# Patient Record
Sex: FEMALE | Race: WHITE | HISPANIC OR LATINO | Employment: STUDENT
[De-identification: names, ages, dates, MRNs, and addresses within clinical notes are randomized per-mention and may not be internally consistent; named-entity substitution may affect disease eponyms.]

---

## 2017-11-26 ENCOUNTER — HEALTH MAINTENANCE LETTER (OUTPATIENT)
Age: 22
End: 2017-11-26

## 2020-01-22 ENCOUNTER — OFFICE VISIT (OUTPATIENT)
Dept: ORTHOPEDICS | Facility: CLINIC | Age: 25
End: 2020-01-22
Payer: COMMERCIAL

## 2020-01-22 VITALS — BODY MASS INDEX: 25.47 KG/M2 | HEIGHT: 72 IN | WEIGHT: 188 LBS | RESPIRATION RATE: 16 BRPM

## 2020-01-22 DIAGNOSIS — M67.969 TENDINOPATHY OF PATELLA: Primary | ICD-10-CM

## 2020-01-22 RX ADMIN — LIDOCAINE HYDROCHLORIDE 10 ML: 10 INJECTION, SOLUTION EPIDURAL; INFILTRATION; INTRACAUDAL; PERINEURAL at 12:59

## 2020-01-22 ASSESSMENT — MIFFLIN-ST. JEOR: SCORE: 1746.51

## 2020-01-22 NOTE — LETTER
1/22/2020      RE: Nika Radha  [address]  [facility] [address]           Large Joint Injection/Arthocentesis  Date/Time: 1/22/2020 12:59 PM  Performed by: Sourav Farris MD  Authorized by: Basilia Krishnan MD     Indications:  Pain  Needle Size:  22 G  Guidance: ultrasound    Approach:  Anterior  Location:  Knee   Location comment:  Bilateral patellar tendon PRP injections       Medications:  10 mL lidocaine (PF) 1 %  Procedure discussed: discussed risks, benefits, and alternatives    Consent Given by:  Patient  Timeout: timeout called immediately prior to procedure    Prep: patient was prepped and draped in usual sterile fashion     Scribed by Alfred Handley ATC, ATC for  on 1/22/20 at 1, based on providers statements to me.        Bilateral patellar tendon plasma rich platelet injections with MSK ultrasound guidance  After risks, benefits and complications were discussed with the patient she elected to proceed.  Written informed consent was obtained. 120 cc of blood was drawn (60cc in each of two syringes) from the right antecubital area. Using sterile technique, the blood was combined with a 8 cc of anticoagulant (ADC) in each syringe and transferred into ACP centrifuge and then spun for 22 min to separate out the plasm rich platelets per protocol.  4 cc of PRP was retrieved. An additional 2cc of PPP was added for total volume of 6cc (goal 3cc injection each side). Using sterile technique, the areas were first prepped with chloraprep. The superior portion of the bilateral patellar tendons and associated skin was injected with 1% lidocaine (2 ml each side) for local.  Under MSK ultrasound guidance, a 22 gauge 1 1/2 inch needle was used to inject 3 cc of PRP per patellar tendon which was peppered along the superior portion of the patellar tendon from in the areas of hypoechogenic areas that were symptomatic. Images were stored on the ultrasound machine. Pt.  tolerated the  procedure well without complications.  Post injection instructions given.      I have instructed the patient that this procedure should increase the pain temporarily and then improve the pain over a few weeks.  It can be repeated if benefits are noted but not as much as anticipated.  Avoid nsaids.  Ok to use tylenol and ice for pain control following the injection. Patient encouraged to begin rehab with ATC next week as planned.      Procedure supervised by Dr. Krishnan.     Sourav Farris MD  Primary Care Sports Medicine Fellow       Attending Note:   I have personally examined this patient and have reviewed the clinical presentation and progress note with the fellow. I agree with the treatment plan as outlined. The plan was formulated with the fellow on the day of the patient's visit. I have reviewed all imaging with the fellow and agree with the findings in the documentation.  I have supervised the PRP procedure and MSK  US injections of the B patellar tendons.     Basilia Krishnan MD, CAQ, CCD  [facility]  Sports Medicine and Bone Health    Basilia Krishnan MD

## 2020-01-22 NOTE — PROGRESS NOTES
Large Joint Injection/Arthocentesis  Date/Time: 1/22/2020 12:59 PM  Performed by: Sourav Farris MD  Authorized by: Basilia Krishnan MD     Indications:  Pain  Needle Size:  22 G  Guidance: ultrasound    Approach:  Anterior  Location:  Knee   Location comment:  Bilateral patellar tendon PRP injections       Medications:  10 mL lidocaine (PF) 1 %  Procedure discussed: discussed risks, benefits, and alternatives    Consent Given by:  Patient  Timeout: timeout called immediately prior to procedure    Prep: patient was prepped and draped in usual sterile fashion     Scribed by Alfred Handley ATC, ATC for  on 1/22/20 at 1, based on providers statements to me.        Bilateral patellar tendon plasma rich platelet injections with MSK ultrasound guidance  After risks, benefits and complications were discussed with the patient she elected to proceed.  Written informed consent was obtained. 120 cc of blood was drawn (60cc in each of two syringes) from the right antecubital area. Using sterile technique, the blood was combined with a 8 cc of anticoagulant (ADC) in each syringe and transferred into ACP centrifuge and then spun for 22 min to separate out the plasm rich platelets per protocol.  4 cc of PRP was retrieved. An additional 2cc of PPP was added for total volume of 6cc (goal 3cc injection each side). Using sterile technique, the areas were first prepped with chloraprep. The superior portion of the bilateral patellar tendons and associated skin was injected with 1% lidocaine (2 ml each side) for local.  Under MSK ultrasound guidance, a 22 gauge 1 1/2 inch needle was used to inject 3 cc of PRP per patellar tendon which was peppered along the superior portion of the patellar tendon from in the areas of hypoechogenic areas that were symptomatic. Images were stored on the ultrasound machine. Pt.  tolerated the procedure well without complications.  Post injection instructions given.      I  have instructed the patient that this procedure should increase the pain temporarily and then improve the pain over a few weeks.  It can be repeated if benefits are noted but not as much as anticipated.  Avoid nsaids.  Ok to use tylenol and ice for pain control following the injection. Patient encouraged to begin rehab with ATC next week as planned.      Procedure supervised by Dr. Krishnan.     Sourav Farris MD  Primary Care Sports Medicine Fellow

## 2020-01-22 NOTE — NURSING NOTE
[facility] SPORTS MEDICINE  [address] [place] [id number]  Dept: [phone]  ______________________________________________________________________________    Patient: Nika Garcia   : 1995   MRN: [id number]   2020    INVASIVE PROCEDURE SAFETY CHECKLIST    Date: 20   Procedure:Bilateral ultrasound guided PRP patellar tendon injections   Patient Name: Nika Garcia  MRN: [id number]  YOB: 1995    Action: Complete sections as appropriate. Any discrepancy results in a HARD COPY until resolved.     PRE PROCEDURE:  Patient ID verified with 2 identifiers (name and  or MRN): Yes  Procedure and site verified with patient/designee (when able): Yes  Accurate consent documentation in medical record: Yes  H&P (or appropriate assessment) documented in medical record: Yes  H&P must be up to 20 days prior to procedure and updates within 24 hours of procedure as applicable: NA  Relevant diagnostic and radiology test results appropriately labeled and displayed as applicable: Yes  Procedure site(s) marked with provider initials: NA    TIMEOUT:  Time-Out performed immediately prior to starting procedure, including verbal and active participation of all team members addressing the following:Yes  * Correct patient identify  * Confirmed that the correct side and site are marked  * An accurate procedure consent form  * Agreement on the procedure to be done  * Correct patient position  * Relevant images and results are properly labeled and appropriately displayed  * The need to administer antibiotics or fluids for irrigation purposes during the procedure as applicable   * Safety precautions based on patient history or medication use    DURING PROCEDURE: Verification of correct person, site, and procedures any time the responsibility for care of the patient is transferred to another member of the care team.       Prior to injection, verified patient identity using patient's  name and date of birth.  Due to injection administration, patient instructed to remain in clinic for 15 minutes  afterwards, and to report any adverse reaction to me immediately.    Tendon sheath injection was performed.     Drug Amount Wasted:  None.  Vial/Syringe: Single dose vial  Expiration Date:  10/23 akash Handley ATC  January 22, 2020

## 2020-01-23 RX ORDER — LIDOCAINE HYDROCHLORIDE 10 MG/ML
10 INJECTION, SOLUTION EPIDURAL; INFILTRATION; INTRACAUDAL; PERINEURAL
Status: DISCONTINUED | OUTPATIENT
Start: 2020-01-22 | End: 2023-02-01

## 2020-01-23 NOTE — PROGRESS NOTES
Attending Note:   I have personally examined this patient and have reviewed the clinical presentation and progress note with the fellow. I agree with the treatment plan as outlined. The plan was formulated with the fellow on the day of the patient's visit. I have reviewed all imaging with the fellow and agree with the findings in the documentation.  I have supervised the PRP procedure and MSK  US injections of the B patellar tendons.     Basilia Krishnan MD, CAQ, CCD  [facility]  Sports Medicine and Bone Health

## 2020-03-02 ENCOUNTER — HEALTH MAINTENANCE LETTER (OUTPATIENT)
Age: 25
End: 2020-03-02

## 2020-12-20 ENCOUNTER — HEALTH MAINTENANCE LETTER (OUTPATIENT)
Age: 25
End: 2020-12-20

## 2021-04-18 ENCOUNTER — HEALTH MAINTENANCE LETTER (OUTPATIENT)
Age: 26
End: 2021-04-18

## 2021-10-03 ENCOUNTER — HEALTH MAINTENANCE LETTER (OUTPATIENT)
Age: 26
End: 2021-10-03

## 2022-05-14 ENCOUNTER — HEALTH MAINTENANCE LETTER (OUTPATIENT)
Age: 27
End: 2022-05-14

## 2022-09-04 ENCOUNTER — HEALTH MAINTENANCE LETTER (OUTPATIENT)
Age: 27
End: 2022-09-04

## 2023-01-30 ENCOUNTER — PRE VISIT (OUTPATIENT)
Dept: ORTHOPEDICS | Facility: CLINIC | Age: 28
End: 2023-01-30

## 2023-01-30 DIAGNOSIS — S86.812A RUPTURE OF LEFT PATELLAR TENDON, INITIAL ENCOUNTER: Primary | ICD-10-CM

## 2023-01-30 NOTE — TELEPHONE ENCOUNTER
DIAGNOSIS: Pre-op for upcoming patella tendon surgery with Dr. Hanna   APPOINTMENT DATE: 01/30/2023   NOTES STATUS DETAILS   OFFICE NOTE from referring provider Internal 01/30/2023 Dr Hanna [facility]    OFFICE NOTE from other specialist N/A    DISCHARGE SUMMARY from hospital N/A    DISCHARGE REPORT from the ER N/A    OPERATIVE REPORT N/A    EMG report N/A    MEDICATION LIST N/A    MRI N/A    DEXA (osteoporosis/bone health) N/A    CT SCAN N/A    XRAYS (IMAGES & REPORTS) N/A

## 2023-01-31 ENCOUNTER — ANESTHESIA EVENT (OUTPATIENT)
Dept: SURGERY | Facility: AMBULATORY SURGERY CENTER | Age: 28
End: 2023-01-31
Payer: COMMERCIAL

## 2023-01-31 ENCOUNTER — PRE VISIT (OUTPATIENT)
Dept: ORTHOPEDICS | Facility: CLINIC | Age: 28
End: 2023-01-31

## 2023-01-31 ENCOUNTER — ANCILLARY PROCEDURE (OUTPATIENT)
Dept: GENERAL RADIOLOGY | Facility: CLINIC | Age: 28
End: 2023-01-31
Attending: ORTHOPAEDIC SURGERY

## 2023-01-31 ENCOUNTER — ANCILLARY PROCEDURE (OUTPATIENT)
Dept: ULTRASOUND IMAGING | Facility: CLINIC | Age: 28
End: 2023-01-31
Attending: ORTHOPAEDIC SURGERY
Payer: COMMERCIAL

## 2023-01-31 ENCOUNTER — OFFICE VISIT (OUTPATIENT)
Dept: ORTHOPEDICS | Facility: CLINIC | Age: 28
End: 2023-01-31

## 2023-01-31 VITALS — HEIGHT: 72 IN | WEIGHT: 180 LBS | BODY MASS INDEX: 24.38 KG/M2

## 2023-01-31 DIAGNOSIS — S86.812A RUPTURE OF LEFT PATELLAR TENDON, INITIAL ENCOUNTER: ICD-10-CM

## 2023-01-31 DIAGNOSIS — S86.812A RUPTURE OF LEFT PATELLAR TENDON, INITIAL ENCOUNTER: Primary | ICD-10-CM

## 2023-01-31 PROCEDURE — 99204 OFFICE O/P NEW MOD 45 MIN: CPT | Mod: 57 | Performed by: ORTHOPAEDIC SURGERY

## 2023-01-31 PROCEDURE — 73560 X-RAY EXAM OF KNEE 1 OR 2: CPT | Mod: GC | Performed by: RADIOLOGY

## 2023-01-31 PROCEDURE — 93971 EXTREMITY STUDY: CPT | Mod: LT | Performed by: RADIOLOGY

## 2023-01-31 RX ORDER — OXYCODONE HYDROCHLORIDE 5 MG/1
5 TABLET ORAL EVERY 4 HOURS PRN
Status: DISCONTINUED | OUTPATIENT
Start: 2023-01-31 | End: 2023-02-02 | Stop reason: HOSPADM

## 2023-01-31 RX ORDER — OXYCODONE HYDROCHLORIDE 5 MG/1
10 TABLET ORAL EVERY 4 HOURS PRN
Status: DISCONTINUED | OUTPATIENT
Start: 2023-01-31 | End: 2023-02-02 | Stop reason: HOSPADM

## 2023-01-31 RX ORDER — NALOXONE HYDROCHLORIDE 0.4 MG/ML
0.2 INJECTION, SOLUTION INTRAMUSCULAR; INTRAVENOUS; SUBCUTANEOUS
Status: DISCONTINUED | OUTPATIENT
Start: 2023-01-31 | End: 2023-02-02 | Stop reason: HOSPADM

## 2023-01-31 RX ORDER — CHLORAL HYDRATE 500 MG
2 CAPSULE ORAL DAILY
COMMUNITY

## 2023-01-31 RX ORDER — NALOXONE HYDROCHLORIDE 0.4 MG/ML
0.4 INJECTION, SOLUTION INTRAMUSCULAR; INTRAVENOUS; SUBCUTANEOUS
Status: DISCONTINUED | OUTPATIENT
Start: 2023-01-31 | End: 2023-02-02 | Stop reason: HOSPADM

## 2023-01-31 NOTE — H&P (VIEW-ONLY)
Department of Orthopedic Surgery  Mallorie Hanna MD        PATIENT NAME: Nika Garcia   MRN: [id number]  AGE: 27 year old  BMI: Body mass index is 23.11 kg/m .  REFERRING PHYSICIAN: No ref. provider found    CHIEF COMPLAINT: Consult (Left patellar tendon rupture)    HISTORY OF PRESENT ILLNESS:  Nika Garcia is a 27 year old female who presents with for evaluation of a left patellar tendon rupture.  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  She was on this leg and preparing to single-leg jump.  She heard and felt a pop, and fell onto her buttock.  She noted a divot to her anterior knee with associated pain.  An MRI was done in [place] which showed the defect in the patella tendon primarily proximally.  We have the CT scan but is having difficulty uploading it.    Patient had a history of patella tendinopathy when playing for the goal first.  She has a 215 MRI of the left knee which does show moderate proximal patella tendinopathy.  Of note she also had a posterior lateral meniscus tear which was felt to be asymptomatic and at that time and treated expectantly.    She traveled via airplane to the [place].  Has been nonweightbearing and utilizing crutches.  No calf pain, no shortness of breath, no chest pain.  Non-smoker.  Not on birth control or taking supplemental estrogen.    Of note, had a right partial patellar tendon rupture which was repaired approximately 5 years ago in [place].  This served her well and she return to high-level volleyball activity.    She has normal menstrual cycle.  She is not on birth control pill.  She does not smoke.    Pertinent negatives:  Patient has no history of DVT or PE. Discussed risk factors.    ALLERGIES: Cats    MEDICATIONS:     Current Outpatient Medications:      Cholecalciferol (VITAMIN D) 2000 UNITS tablet, Take 2,000 Units by mouth daily, Disp: 100 tablet, Rfl:  3     fish oil-omega-3 fatty acids 1000 MG capsule, Take 2 g by mouth daily, Disp: , Rfl:      benzoyl peroxide ( BENZOYL PEROXIDE WASH) 5 % LIQD, Daily in shower and then rinse off (Patient not taking: Reported on 1/22/2020), Disp: 226 g, Rfl: 11     clindamycin (CLEOCIN T) 1 % lotion, Daily to acne (Patient not taking: Reported on 1/22/2020), Disp: 60 mL, Rfl: 3     doxycycline Monohydrate 100 MG CAPS, Take 1 capsule (100 mg) by mouth 2 times daily (Patient not taking: Reported on 1/22/2020), Disp: 60 capsule, Rfl: 3     Ergocalciferol (VITAMIN D2) 2000 UNITS TABS, Take 1 tablet by mouth daily (Patient not taking: Reported on 1/22/2020), Disp: 100 tablet, Rfl: 3     ferrous sulfate (SLO-FE) 142 (45 FE) MG TBCR, Take 1 tablet (142 mg) by mouth 2 times daily (with meals) (Patient not taking: Reported on 1/31/2023), Disp: 60 tablet, Rfl: 6    Current Facility-Administered Medications:      lidocaine (PF) (XYLOCAINE) 1 % injection 10 mL, 10 mL, , , Basilia Krishnan MD, 10 mL at 01/22/20 1259    SOCIAL HISTORY:   Social History     Tobacco Use     Smoking status: Never     Smokeless tobacco: Never   Substance Use Topics     Alcohol use: No       PHYSICAL EXAMINATION:  On physical examination the patient appears the stated age, is in no acute distress, affect is appropriate, and breathing is non-labored.  BMI: Body mass index is 23.11 kg/m .  LLE:  No deformity, skin intact.  Palpable defect about patellar tendon  Prior surgical incision: none  Overall limb alignment is: Neutral  Effusion or swelling of the knee: Significant  Tenderness to palpation: Diffuse about patellar tendon and patellar poles.  Nontender about tibial tubercle  ROM: Deferred in setting of known injury  Pain with knee ROM: Deferred in setting of known injury.  No calf tenderness.  Crepitance with knee ROM: Deferred in setting of known injury  Extensor lag: Unable to straight leg raise     Motor intact distally TA/GSC/EHL/FHL with 5/5  strength. SILT sp/dp/tibial/saph/sural nerves. DP/PT pulses palpable, 2+, toes warm and well perfused.      IMAGING:   Radiographs bilateral knees dated 1/31/2023, ultrasound left knee dated 1/31/2023, an MRI left knee dated 9/16/2025 available for personal review.  This demonstrates near symmetric patellar height of bilateral knee.  Superior patellar tendinopathy on prior MRI.    L CD 1.15  R CD 1.18     Ultrasound of her left limb was performed prior to visit.  It was negative for DVT.    ASSESSMENT/PLAN: Nika Garcia is a 27 year old female with a left patellar tendon rupture.    We discussed the treatment options for this largely being surgical.  She has completed a preoperative history and physical and has a tentative surgical date scheduled for tomorrow, 2/1/2023.      We reviewed the surgical details as well as the recovery which would be on the order of approximately 6 months before returning to play.    She plans to recover in [place] with her family and return to [place] where she resides with her significant other in approximately 3 weeks.    She presented today with her , her significant other, and her father.    Dr. Aldrich was also available to meet with them for this discussion.  All of their questions were answered to satisfaction.  We will see her tomorrow on the day of surgery.       Seen and discussed with Dr. Cyndi Juarez MD PGY4    Room time: 30 min, Consultation time: 20 min, mostly spent discussing the diagnosis and surgical recommendation, including potential complications and time line for return to function.    ATTESTATION:  I have personally examined this patient and have reviewed the clinical presentation and progress note with the resident.  I agree with the treatment plan as outlined.  The plan was formulated with the resident on the day of the resident dictation.     Mallorie Hanna

## 2023-01-31 NOTE — LETTER
1/31/2023         RE: Nika Garcia  [address]  [facility] [address]        Dear Colleague,    Thank you for referring your patient, Nika Garcia, to the [facility] [place]. Please see a copy of my visit note below.        Department of Orthopedic Surgery  Mallorie Hanna MD        PATIENT NAME: Nika Garcia   MRN: [id number]  AGE: 27 year old  BMI: Body mass index is 23.11 kg/m .  REFERRING PHYSICIAN: No ref. provider found    CHIEF COMPLAINT: Consult (Left patellar tendon rupture)    HISTORY OF PRESENT ILLNESS:  Nika Garcia is a 27 year old female who presents with for evaluation of a left patellar tendon rupture.  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  She was on this leg and preparing to single-leg jump.  She heard and felt a pop, and fell onto her buttock.  She noted a divot to her anterior knee with associated pain.  An MRI was done in [place] which showed the defect in the patella tendon primarily proximally.  We have the CT scan but is having difficulty uploading it.    Patient had a history of patella tendinopathy when playing for the goal first.  She has a 215 MRI of the left knee which does show moderate proximal patella tendinopathy.  Of note she also had a posterior lateral meniscus tear which was felt to be asymptomatic and at that time and treated expectantly.    [place]  Has been nonweightbearing and utilizing crutches.  No calf pain, no shortness of breath, no chest pain.  Non-smoker.  Not on birth control or taking supplemental estrogen.    Of note, had a right partial patellar tendon rupture which was repaired approximately 5 years ago in [place].  This served her well and she return to high-level volleyball activity.    She has normal menstrual cycle.  She is not on birth control pill.  She does not  smoke.    Pertinent negatives:  Patient has no history of DVT or PE. Discussed risk factors.    ALLERGIES: Cats    MEDICATIONS:     Current Outpatient Medications:      Cholecalciferol (VITAMIN D) 2000 UNITS tablet, Take 2,000 Units by mouth daily, Disp: 100 tablet, Rfl: 3     fish oil-omega-3 fatty acids 1000 MG capsule, Take 2 g by mouth daily, Disp: , Rfl:      benzoyl peroxide (KP BENZOYL PEROXIDE WASH) 5 % LIQD, Daily in shower and then rinse off (Patient not taking: Reported on 1/22/2020), Disp: 226 g, Rfl: 11     clindamycin (CLEOCIN T) 1 % lotion, Daily to acne (Patient not taking: Reported on 1/22/2020), Disp: 60 mL, Rfl: 3     doxycycline Monohydrate 100 MG CAPS, Take 1 capsule (100 mg) by mouth 2 times daily (Patient not taking: Reported on 1/22/2020), Disp: 60 capsule, Rfl: 3     Ergocalciferol (VITAMIN D2) 2000 UNITS TABS, Take 1 tablet by mouth daily (Patient not taking: Reported on 1/22/2020), Disp: 100 tablet, Rfl: 3     ferrous sulfate (SLO-FE) 142 (45 FE) MG TBCR, Take 1 tablet (142 mg) by mouth 2 times daily (with meals) (Patient not taking: Reported on 1/31/2023), Disp: 60 tablet, Rfl: 6    Current Facility-Administered Medications:      lidocaine (PF) (XYLOCAINE) 1 % injection 10 mL, 10 mL, , , Basilia Krishnan MD, 10 mL at 01/22/20 1259    SOCIAL HISTORY:   Social History     Tobacco Use     Smoking status: Never     Smokeless tobacco: Never   Substance Use Topics     Alcohol use: No       PHYSICAL EXAMINATION:  On physical examination the patient appears the stated age, is in no acute distress, affect is appropriate, and breathing is non-labored.  BMI: Body mass index is 23.11 kg/m .  LLE:  No deformity, skin intact.  Palpable defect about patellar tendon  Prior surgical incision: none  Overall limb alignment is: Neutral  Effusion or swelling of the knee: Significant  Tenderness to palpation: Diffuse about patellar tendon and patellar poles.  Nontender about tibial tubercle  ROM:  Deferred in setting of known injury  Pain with knee ROM: Deferred in setting of known injury.  No calf tenderness.  Crepitance with knee ROM: Deferred in setting of known injury  Extensor lag: Unable to straight leg raise     Motor intact distally TA/GSC/EHL/FHL with 5/5 strength. SILT sp/dp/tibial/saph/sural nerves. DP/PT pulses palpable, 2+, toes warm and well perfused.      IMAGING:   Radiographs bilateral knees dated 1/31/2023, ultrasound left knee dated 1/31/2023, an MRI left knee dated 9/16/2025 available for personal review.  This demonstrates near symmetric patellar height of bilateral knee.  Superior patellar tendinopathy on prior MRI.    L CD 1.15  R CD 1.18     Ultrasound of her left limb was performed prior to visit.  It was negative for DVT.    ASSESSMENT/PLAN: Nika Garcia is a 27 year old female with a left patellar tendon rupture.    We discussed the treatment options for this largely being surgical.  She has completed a preoperative history and physical and has a tentative surgical date scheduled for tomorrow, 2/1/2023.      We reviewed the surgical details as well as the recovery which would be on the order of approximately 6 months before returning to play.    She plans to recover in [place] with her family and return to [place] where she resides with her significant other in approximately 3 weeks.    She presented today with her , her significant other, and her father.    Dr. Aldrich was also available to meet with them for this discussion.  All of their questions were answered to satisfaction.  We will see her tomorrow on the day of surgery.       Seen and discussed with Dr. Cyndi Juarez MD PGY4    Room time: 30 min, Consultation time: 20 min, mostly spent discussing the diagnosis and surgical recommendation, including potential complications and time line for return to function.    ATTESTATION:  I have personally examined this patient and have reviewed the  clinical presentation and progress note with the resident.  I agree with the treatment plan as outlined.  The plan was formulated with the resident on the day of the resident dictation.     Mallorie Hanna

## 2023-01-31 NOTE — PROGRESS NOTES
Department of Orthopedic Surgery  Mallorie Hanna MD        PATIENT NAME: Nika Garcia   MRN: [id number]  AGE: 27 year old  BMI: Body mass index is 23.11 kg/m .  REFERRING PHYSICIAN: No ref. provider found    CHIEF COMPLAINT: Consult (Left patellar tendon rupture)    HISTORY OF PRESENT ILLNESS:  Nika Garcia is a 27 year old female who presents with for evaluation of a left patellar tendon rupture.  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  She was on this leg and preparing to single-leg jump.  She heard and felt a pop, and fell onto her buttock.  She noted a divot to her anterior knee with associated pain.  An MRI was done in [place] which showed the defect in the patella tendon primarily proximally.  We have the CT scan but is having difficulty uploading it.    Patient had a history of patella tendinopathy when playing for the goal first.  She has a 215 MRI of the left knee which does show moderate proximal patella tendinopathy.  Of note she also had a posterior lateral meniscus tear which was felt to be asymptomatic and at that time and treated expectantly.    She traveled via airplane to the [place].  Has been nonweightbearing and utilizing crutches.  No calf pain, no shortness of breath, no chest pain.  Non-smoker.  Not on birth control or taking supplemental estrogen.    Of note, had a right partial patellar tendon rupture which was repaired approximately 5 years ago in [place].  This served her well and she return to high-level volleyball activity.    She has normal menstrual cycle.  She is not on birth control pill.  She does not smoke.    Pertinent negatives:  Patient has no history of DVT or PE. Discussed risk factors.    ALLERGIES: Cats    MEDICATIONS:     Current Outpatient Medications:      Cholecalciferol (VITAMIN D) 2000 UNITS tablet, Take 2,000 Units by mouth daily, Disp: 100 tablet, Rfl:  3     fish oil-omega-3 fatty acids 1000 MG capsule, Take 2 g by mouth daily, Disp: , Rfl:      benzoyl peroxide ( BENZOYL PEROXIDE WASH) 5 % LIQD, Daily in shower and then rinse off (Patient not taking: Reported on 1/22/2020), Disp: 226 g, Rfl: 11     clindamycin (CLEOCIN T) 1 % lotion, Daily to acne (Patient not taking: Reported on 1/22/2020), Disp: 60 mL, Rfl: 3     doxycycline Monohydrate 100 MG CAPS, Take 1 capsule (100 mg) by mouth 2 times daily (Patient not taking: Reported on 1/22/2020), Disp: 60 capsule, Rfl: 3     Ergocalciferol (VITAMIN D2) 2000 UNITS TABS, Take 1 tablet by mouth daily (Patient not taking: Reported on 1/22/2020), Disp: 100 tablet, Rfl: 3     ferrous sulfate (SLO-FE) 142 (45 FE) MG TBCR, Take 1 tablet (142 mg) by mouth 2 times daily (with meals) (Patient not taking: Reported on 1/31/2023), Disp: 60 tablet, Rfl: 6    Current Facility-Administered Medications:      lidocaine (PF) (XYLOCAINE) 1 % injection 10 mL, 10 mL, , , Basilia Krishnan MD, 10 mL at 01/22/20 1259    SOCIAL HISTORY:   Social History     Tobacco Use     Smoking status: Never     Smokeless tobacco: Never   Substance Use Topics     Alcohol use: No       PHYSICAL EXAMINATION:  On physical examination the patient appears the stated age, is in no acute distress, affect is appropriate, and breathing is non-labored.  BMI: Body mass index is 23.11 kg/m .  LLE:  No deformity, skin intact.  Palpable defect about patellar tendon  Prior surgical incision: none  Overall limb alignment is: Neutral  Effusion or swelling of the knee: Significant  Tenderness to palpation: Diffuse about patellar tendon and patellar poles.  Nontender about tibial tubercle  ROM: Deferred in setting of known injury  Pain with knee ROM: Deferred in setting of known injury.  No calf tenderness.  Crepitance with knee ROM: Deferred in setting of known injury  Extensor lag: Unable to straight leg raise     Motor intact distally TA/GSC/EHL/FHL with 5/5  strength. SILT sp/dp/tibial/saph/sural nerves. DP/PT pulses palpable, 2+, toes warm and well perfused.      IMAGING:   Radiographs bilateral knees dated 1/31/2023, ultrasound left knee dated 1/31/2023, an MRI left knee dated 9/16/2025 available for personal review.  This demonstrates near symmetric patellar height of bilateral knee.  Superior patellar tendinopathy on prior MRI.    L CD 1.15  R CD 1.18     Ultrasound of her left limb was performed prior to visit.  It was negative for DVT.    ASSESSMENT/PLAN: Nika Garcia is a 27 year old female with a left patellar tendon rupture.    We discussed the treatment options for this largely being surgical.  She has completed a preoperative history and physical and has a tentative surgical date scheduled for tomorrow, 2/1/2023.      We reviewed the surgical details as well as the recovery which would be on the order of approximately 6 months before returning to play.    She plans to recover in [place] with her family and return to [place] where she resides with her significant other in approximately 3 weeks.    She presented today with her , her significant other, and her father.    Dr. Aldrich was also available to meet with them for this discussion.  All of their questions were answered to satisfaction.  We will see her tomorrow on the day of surgery.       Seen and discussed with Dr. Cyndi Juarez MD PGY4    Room time: 30 min, Consultation time: 20 min, mostly spent discussing the diagnosis and surgical recommendation, including potential complications and time line for return to function.    ATTESTATION:  I have personally examined this patient and have reviewed the clinical presentation and progress note with the resident.  I agree with the treatment plan as outlined.  The plan was formulated with the resident on the day of the resident dictation.     Mallorie Hanna

## 2023-01-31 NOTE — TELEPHONE ENCOUNTER
DIAGNOSIS: LT Patellar Tendon Rupture   APPOINTMENT DATE: 01/31/2023   NOTES STATUS DETAILS   OFFICE NOTE from referring provider Internal PRE-OP: 01/30/2023 - [facility]   OFFICE NOTE from other specialist Internal    MEDICATION LIST Internal

## 2023-01-31 NOTE — NURSING NOTE
"Reason For Visit:   Chief Complaint   Patient presents with     Consult     Left patellar tendon rupture       Primary MD: Antoine Taylor  Ref. MD: Est    ?  No  Occupation Pro [employer].  Currently working? Yes.  Work status?  Full time.    Date of injury: 1/27/2023  Type of injury: sport injury.    Date of surgery: No  Type of surgery: No.    Smoker: No  Request smoking cessation information: No    Ht 1.88 m (6' 2\")   Wt 81.6 kg (180 lb)   BMI 23.11 kg/m      Pain Assessment  Patient Currently in Pain: Yes  0-10 Pain Scale: 2  Primary Pain Location: Knee       Kaleigh Formato, LPN  "

## 2023-01-31 NOTE — NURSING NOTE
Teaching Flowsheet   Relevant Diagnosis: left patellar tendon rupture  Teaching Topic: left patella tendon repair, arthrotomy    Patient had H&P completed yesterday with Dr. Prabhakar, she will have surgery tomorrow with Dr. Aldrich and Dr. Hanna.  Phone number for Jaison Aguilar provided per Rosy Sinha, Supervisor - patient will have a 2nd guarantor for surgery and would like to set up billing for this.     Person(s) involved in teaching:   Patient, training staff, family and SO     Motivation Level:  Asks Questions: Yes  Eager to Learn: Yes  Cooperative: Yes  Receptive (willing/able to accept information): Yes  Any cultural factors/Protestant beliefs that may influence understanding or compliance? No  Comments: none     Patient demonstrates understanding of the following:  Reason for the appointment, diagnosis and treatment plan: Yes  Knowledge of proper use of medications and conditions for which they are ordered (with special attention to potential side effects or drug interactions): Yes  Which situations necessitate calling provider and whom to contact: Yes       Teaching Concerns Addressed:   Comments: none     Proper use and care of (medical equip, care aids, etc.): Yes  Nutritional needs and diet plan: Yes  Pain management techniques: Yes  Wound Care: Yes  How and/when to access community resources: Yes     Instructional Materials Used/Given: surgery packet, stoplight tool, chlorhexidine soap     Time spent with patient: 30 minutes.    Tamar Lozano RN on 1/31/2023 at 11:48 AM

## 2023-02-01 ENCOUNTER — ANESTHESIA (OUTPATIENT)
Dept: SURGERY | Facility: AMBULATORY SURGERY CENTER | Age: 28
End: 2023-02-01
Payer: COMMERCIAL

## 2023-02-01 ENCOUNTER — ANCILLARY PROCEDURE (OUTPATIENT)
Dept: RADIOLOGY | Facility: AMBULATORY SURGERY CENTER | Age: 28
End: 2023-02-01
Attending: ORTHOPAEDIC SURGERY

## 2023-02-01 ENCOUNTER — HOSPITAL ENCOUNTER (OUTPATIENT)
Facility: AMBULATORY SURGERY CENTER | Age: 28
Discharge: HOME | End: 2023-02-01
Attending: ORTHOPAEDIC SURGERY
Payer: COMMERCIAL

## 2023-02-01 VITALS
HEIGHT: 72 IN | TEMPERATURE: 97.5 F | SYSTOLIC BLOOD PRESSURE: 117 MMHG | OXYGEN SATURATION: 96 % | BODY MASS INDEX: 24.38 KG/M2 | RESPIRATION RATE: 13 BRPM | HEART RATE: 68 BPM | DIASTOLIC BLOOD PRESSURE: 63 MMHG | WEIGHT: 180 LBS

## 2023-02-01 DIAGNOSIS — M25.369 PATELLAR INSTABILITY: ICD-10-CM

## 2023-02-01 DIAGNOSIS — S86.812A RUPTURE OF LEFT PATELLAR TENDON, INITIAL ENCOUNTER: ICD-10-CM

## 2023-02-01 LAB
HCG UR QL: NEGATIVE
INTERNAL QC OK POCT: NORMAL
POCT KIT EXPIRATION DATE: NORMAL
POCT KIT LOT NUMBER: NORMAL

## 2023-02-01 PROCEDURE — 27380 REPAIR OF KNEECAP TENDON: CPT | Mod: LT

## 2023-02-01 PROCEDURE — 27380 REPAIR OF KNEECAP TENDON: CPT | Mod: LT | Performed by: ORTHOPAEDIC SURGERY

## 2023-02-01 PROCEDURE — C9290 INJ, BUPIVACAINE LIPOSOME: HCPCS

## 2023-02-01 PROCEDURE — 81025 URINE PREGNANCY TEST: CPT | Performed by: PATHOLOGY

## 2023-02-01 RX ORDER — FENTANYL CITRATE 50 UG/ML
25 INJECTION, SOLUTION INTRAMUSCULAR; INTRAVENOUS EVERY 5 MIN PRN
Status: CANCELLED | OUTPATIENT
Start: 2023-02-01

## 2023-02-01 RX ORDER — OXYCODONE HYDROCHLORIDE 5 MG/1
5 TABLET ORAL EVERY 4 HOURS PRN
Status: DISCONTINUED | OUTPATIENT
Start: 2023-02-01 | End: 2023-02-02 | Stop reason: HOSPADM

## 2023-02-01 RX ORDER — ONDANSETRON 2 MG/ML
4 INJECTION INTRAMUSCULAR; INTRAVENOUS EVERY 30 MIN PRN
Status: CANCELLED | OUTPATIENT
Start: 2023-02-01

## 2023-02-01 RX ORDER — OXYCODONE HYDROCHLORIDE 5 MG/1
10 TABLET ORAL EVERY 4 HOURS PRN
Status: DISCONTINUED | OUTPATIENT
Start: 2023-02-01 | End: 2023-02-02 | Stop reason: HOSPADM

## 2023-02-01 RX ORDER — DIAZEPAM 5 MG
5 TABLET ORAL ONCE
Status: CANCELLED | OUTPATIENT
Start: 2023-02-01

## 2023-02-01 RX ORDER — DEXAMETHASONE SODIUM PHOSPHATE 10 MG/ML
4 INJECTION, SOLUTION INTRAMUSCULAR; INTRAVENOUS
Status: CANCELLED | OUTPATIENT
Start: 2023-02-01

## 2023-02-01 RX ORDER — LIDOCAINE HYDROCHLORIDE 20 MG/ML
INJECTION, SOLUTION INFILTRATION; PERINEURAL PRN
Status: DISCONTINUED | OUTPATIENT
Start: 2023-02-01 | End: 2023-02-01

## 2023-02-01 RX ORDER — FENTANYL CITRATE 50 UG/ML
50 INJECTION, SOLUTION INTRAMUSCULAR; INTRAVENOUS EVERY 5 MIN PRN
Status: CANCELLED | OUTPATIENT
Start: 2023-02-01

## 2023-02-01 RX ORDER — LIDOCAINE 40 MG/G
CREAM TOPICAL
Status: DISCONTINUED | OUTPATIENT
Start: 2023-02-01 | End: 2023-02-01 | Stop reason: HOSPADM

## 2023-02-01 RX ORDER — SODIUM CHLORIDE, SODIUM LACTATE, POTASSIUM CHLORIDE, CALCIUM CHLORIDE 600; 310; 30; 20 MG/100ML; MG/100ML; MG/100ML; MG/100ML
INJECTION, SOLUTION INTRAVENOUS CONTINUOUS
Status: DISCONTINUED | OUTPATIENT
Start: 2023-02-01 | End: 2023-02-01 | Stop reason: HOSPADM

## 2023-02-01 RX ORDER — CEFAZOLIN SODIUM 2 G/50ML
2 SOLUTION INTRAVENOUS SEE ADMIN INSTRUCTIONS
Status: DISCONTINUED | OUTPATIENT
Start: 2023-02-01 | End: 2023-02-01 | Stop reason: HOSPADM

## 2023-02-01 RX ORDER — PROPOFOL 10 MG/ML
INJECTION, EMULSION INTRAVENOUS CONTINUOUS PRN
Status: DISCONTINUED | OUTPATIENT
Start: 2023-02-01 | End: 2023-02-01

## 2023-02-01 RX ORDER — SODIUM CHLORIDE, SODIUM LACTATE, POTASSIUM CHLORIDE, CALCIUM CHLORIDE 600; 310; 30; 20 MG/100ML; MG/100ML; MG/100ML; MG/100ML
INJECTION, SOLUTION INTRAVENOUS CONTINUOUS
Status: CANCELLED | OUTPATIENT
Start: 2023-02-01

## 2023-02-01 RX ORDER — SODIUM CHLORIDE, SODIUM LACTATE, POTASSIUM CHLORIDE, CALCIUM CHLORIDE 600; 310; 30; 20 MG/100ML; MG/100ML; MG/100ML; MG/100ML
INJECTION, SOLUTION INTRAVENOUS CONTINUOUS
Status: DISCONTINUED | OUTPATIENT
Start: 2023-02-01 | End: 2023-02-02 | Stop reason: HOSPADM

## 2023-02-01 RX ORDER — ACETAMINOPHEN 325 MG/1
975 TABLET ORAL ONCE
Status: CANCELLED | OUTPATIENT
Start: 2023-02-01 | End: 2023-02-01

## 2023-02-01 RX ORDER — ONDANSETRON 4 MG/1
4 TABLET, ORALLY DISINTEGRATING ORAL EVERY 30 MIN PRN
Status: DISCONTINUED | OUTPATIENT
Start: 2023-02-01 | End: 2023-02-02 | Stop reason: HOSPADM

## 2023-02-01 RX ORDER — ONDANSETRON 2 MG/ML
4 INJECTION INTRAMUSCULAR; INTRAVENOUS EVERY 30 MIN PRN
Status: DISCONTINUED | OUTPATIENT
Start: 2023-02-01 | End: 2023-02-02 | Stop reason: HOSPADM

## 2023-02-01 RX ORDER — OXYCODONE HYDROCHLORIDE 5 MG/1
10 TABLET ORAL EVERY 4 HOURS PRN
Status: CANCELLED | OUTPATIENT
Start: 2023-02-01

## 2023-02-01 RX ORDER — HYDROMORPHONE HYDROCHLORIDE 1 MG/ML
0.2 INJECTION, SOLUTION INTRAMUSCULAR; INTRAVENOUS; SUBCUTANEOUS EVERY 5 MIN PRN
Status: DISCONTINUED | OUTPATIENT
Start: 2023-02-01 | End: 2023-02-02 | Stop reason: HOSPADM

## 2023-02-01 RX ORDER — HYDROXYZINE HYDROCHLORIDE 25 MG/1
25 TABLET, FILM COATED ORAL EVERY 6 HOURS PRN
Status: CANCELLED | OUTPATIENT
Start: 2023-02-01

## 2023-02-01 RX ORDER — HALOPERIDOL 5 MG/ML
1 INJECTION INTRAMUSCULAR
Status: CANCELLED | OUTPATIENT
Start: 2023-02-01

## 2023-02-01 RX ORDER — BUPIVACAINE HYDROCHLORIDE AND EPINEPHRINE 5; 5 MG/ML; UG/ML
INJECTION, SOLUTION PERINEURAL
Status: COMPLETED | OUTPATIENT
Start: 2023-02-01 | End: 2023-02-01

## 2023-02-01 RX ORDER — FENTANYL CITRATE 50 UG/ML
25-50 INJECTION, SOLUTION INTRAMUSCULAR; INTRAVENOUS
Status: DISCONTINUED | OUTPATIENT
Start: 2023-02-01 | End: 2023-02-01 | Stop reason: HOSPADM

## 2023-02-01 RX ORDER — LABETALOL HYDROCHLORIDE 5 MG/ML
10 INJECTION, SOLUTION INTRAVENOUS
Status: DISCONTINUED | OUTPATIENT
Start: 2023-02-01 | End: 2023-02-02 | Stop reason: HOSPADM

## 2023-02-01 RX ORDER — LORAZEPAM 2 MG/ML
.5-1 INJECTION INTRAMUSCULAR
Status: CANCELLED | OUTPATIENT
Start: 2023-02-01

## 2023-02-01 RX ORDER — ONDANSETRON 2 MG/ML
INJECTION INTRAMUSCULAR; INTRAVENOUS PRN
Status: DISCONTINUED | OUTPATIENT
Start: 2023-02-01 | End: 2023-02-01

## 2023-02-01 RX ORDER — PROPOFOL 10 MG/ML
INJECTION, EMULSION INTRAVENOUS PRN
Status: DISCONTINUED | OUTPATIENT
Start: 2023-02-01 | End: 2023-02-01

## 2023-02-01 RX ORDER — DIMENHYDRINATE 50 MG/ML
25 INJECTION, SOLUTION INTRAMUSCULAR; INTRAVENOUS
Status: CANCELLED | OUTPATIENT
Start: 2023-02-01

## 2023-02-01 RX ORDER — SODIUM CHLORIDE, SODIUM LACTATE, POTASSIUM CHLORIDE, CALCIUM CHLORIDE 600; 310; 30; 20 MG/100ML; MG/100ML; MG/100ML; MG/100ML
INJECTION, SOLUTION INTRAVENOUS CONTINUOUS PRN
Status: DISCONTINUED | OUTPATIENT
Start: 2023-02-01 | End: 2023-02-01

## 2023-02-01 RX ORDER — HYDROMORPHONE HYDROCHLORIDE 1 MG/ML
0.2 INJECTION, SOLUTION INTRAMUSCULAR; INTRAVENOUS; SUBCUTANEOUS EVERY 5 MIN PRN
Status: CANCELLED | OUTPATIENT
Start: 2023-02-01

## 2023-02-01 RX ORDER — KETOROLAC TROMETHAMINE 30 MG/ML
INJECTION, SOLUTION INTRAMUSCULAR; INTRAVENOUS PRN
Status: DISCONTINUED | OUTPATIENT
Start: 2023-02-01 | End: 2023-02-01

## 2023-02-01 RX ORDER — ACETAMINOPHEN 325 MG/1
650 TABLET ORAL EVERY 4 HOURS PRN
Qty: 50 TABLET | Refills: 0 | Status: SHIPPED | OUTPATIENT
Start: 2023-02-01

## 2023-02-01 RX ORDER — FENTANYL CITRATE 50 UG/ML
INJECTION, SOLUTION INTRAMUSCULAR; INTRAVENOUS PRN
Status: DISCONTINUED | OUTPATIENT
Start: 2023-02-01 | End: 2023-02-01

## 2023-02-01 RX ORDER — OXYCODONE HYDROCHLORIDE 5 MG/1
5 TABLET ORAL EVERY 4 HOURS PRN
Status: CANCELLED | OUTPATIENT
Start: 2023-02-01

## 2023-02-01 RX ORDER — ACETAMINOPHEN 325 MG/1
650 TABLET ORAL
Status: DISCONTINUED | OUTPATIENT
Start: 2023-02-01 | End: 2023-02-02 | Stop reason: HOSPADM

## 2023-02-01 RX ORDER — AMOXICILLIN 250 MG
1-2 CAPSULE ORAL 2 TIMES DAILY
Qty: 30 TABLET | Refills: 0 | Status: SHIPPED | OUTPATIENT
Start: 2023-02-01

## 2023-02-01 RX ORDER — ALBUTEROL SULFATE 0.83 MG/ML
2.5 SOLUTION RESPIRATORY (INHALATION) EVERY 4 HOURS PRN
Status: CANCELLED | OUTPATIENT
Start: 2023-02-01

## 2023-02-01 RX ORDER — OXYCODONE HYDROCHLORIDE 5 MG/1
5 TABLET ORAL
Status: DISCONTINUED | OUTPATIENT
Start: 2023-02-01 | End: 2023-02-02 | Stop reason: HOSPADM

## 2023-02-01 RX ORDER — ACETAMINOPHEN 325 MG/1
975 TABLET ORAL ONCE
Status: DISCONTINUED | OUTPATIENT
Start: 2023-02-01 | End: 2023-02-01 | Stop reason: HOSPADM

## 2023-02-01 RX ORDER — LABETALOL HYDROCHLORIDE 5 MG/ML
10 INJECTION, SOLUTION INTRAVENOUS
Status: CANCELLED | OUTPATIENT
Start: 2023-02-01

## 2023-02-01 RX ORDER — DEXAMETHASONE SODIUM PHOSPHATE 4 MG/ML
INJECTION, SOLUTION INTRA-ARTICULAR; INTRALESIONAL; INTRAMUSCULAR; INTRAVENOUS; SOFT TISSUE PRN
Status: DISCONTINUED | OUTPATIENT
Start: 2023-02-01 | End: 2023-02-01

## 2023-02-01 RX ORDER — FENTANYL CITRATE 50 UG/ML
25 INJECTION, SOLUTION INTRAMUSCULAR; INTRAVENOUS EVERY 5 MIN PRN
Status: DISCONTINUED | OUTPATIENT
Start: 2023-02-01 | End: 2023-02-02 | Stop reason: HOSPADM

## 2023-02-01 RX ORDER — FLUMAZENIL 0.1 MG/ML
0.2 INJECTION, SOLUTION INTRAVENOUS
Status: DISCONTINUED | OUTPATIENT
Start: 2023-02-01 | End: 2023-02-01 | Stop reason: HOSPADM

## 2023-02-01 RX ORDER — KETOROLAC TROMETHAMINE 30 MG/ML
15 INJECTION, SOLUTION INTRAMUSCULAR; INTRAVENOUS
Status: CANCELLED | OUTPATIENT
Start: 2023-02-01

## 2023-02-01 RX ORDER — ONDANSETRON 4 MG/1
4 TABLET, ORALLY DISINTEGRATING ORAL EVERY 30 MIN PRN
Status: CANCELLED | OUTPATIENT
Start: 2023-02-01

## 2023-02-01 RX ORDER — CEFAZOLIN SODIUM 2 G/50ML
2 SOLUTION INTRAVENOUS
Status: COMPLETED | OUTPATIENT
Start: 2023-02-01 | End: 2023-02-01

## 2023-02-01 RX ORDER — ONDANSETRON 4 MG/1
4 TABLET, ORALLY DISINTEGRATING ORAL EVERY 8 HOURS PRN
Qty: 4 TABLET | Refills: 0 | Status: SHIPPED | OUTPATIENT
Start: 2023-02-01

## 2023-02-01 RX ORDER — OXYCODONE HYDROCHLORIDE 5 MG/1
5-10 TABLET ORAL EVERY 4 HOURS PRN
Qty: 15 TABLET | Refills: 0 | Status: SHIPPED | OUTPATIENT
Start: 2023-02-01 | End: 2023-02-03

## 2023-02-01 RX ORDER — SCOLOPAMINE TRANSDERMAL SYSTEM 1 MG/1
1 PATCH, EXTENDED RELEASE TRANSDERMAL
Status: DISCONTINUED | OUTPATIENT
Start: 2023-02-01 | End: 2023-02-02 | Stop reason: HOSPADM

## 2023-02-01 RX ORDER — HYDRALAZINE HYDROCHLORIDE 20 MG/ML
2.5-5 INJECTION INTRAMUSCULAR; INTRAVENOUS EVERY 10 MIN PRN
Status: CANCELLED | OUTPATIENT
Start: 2023-02-01

## 2023-02-01 RX ORDER — HYDROMORPHONE HYDROCHLORIDE 1 MG/ML
0.4 INJECTION, SOLUTION INTRAMUSCULAR; INTRAVENOUS; SUBCUTANEOUS EVERY 5 MIN PRN
Status: DISCONTINUED | OUTPATIENT
Start: 2023-02-01 | End: 2023-02-02 | Stop reason: HOSPADM

## 2023-02-01 RX ORDER — HYDROMORPHONE HYDROCHLORIDE 1 MG/ML
0.4 INJECTION, SOLUTION INTRAMUSCULAR; INTRAVENOUS; SUBCUTANEOUS EVERY 5 MIN PRN
Status: CANCELLED | OUTPATIENT
Start: 2023-02-01

## 2023-02-01 RX ORDER — FENTANYL CITRATE 50 UG/ML
50 INJECTION, SOLUTION INTRAMUSCULAR; INTRAVENOUS EVERY 5 MIN PRN
Status: DISCONTINUED | OUTPATIENT
Start: 2023-02-01 | End: 2023-02-02 | Stop reason: HOSPADM

## 2023-02-01 RX ORDER — MEPERIDINE HYDROCHLORIDE 25 MG/ML
12.5 INJECTION INTRAMUSCULAR; INTRAVENOUS; SUBCUTANEOUS EVERY 5 MIN PRN
Status: CANCELLED | OUTPATIENT
Start: 2023-02-01

## 2023-02-01 RX ADMIN — PROPOFOL 200 MG: 10 INJECTION, EMULSION INTRAVENOUS at 13:50

## 2023-02-01 RX ADMIN — Medication 0.5 MG: at 15:34

## 2023-02-01 RX ADMIN — FENTANYL CITRATE 25 MCG: 50 INJECTION, SOLUTION INTRAMUSCULAR; INTRAVENOUS at 16:09

## 2023-02-01 RX ADMIN — FENTANYL CITRATE 50 MCG: 50 INJECTION, SOLUTION INTRAMUSCULAR; INTRAVENOUS at 15:48

## 2023-02-01 RX ADMIN — FENTANYL CITRATE 50 MCG: 50 INJECTION, SOLUTION INTRAMUSCULAR; INTRAVENOUS at 16:00

## 2023-02-01 RX ADMIN — FENTANYL CITRATE 50 MCG: 50 INJECTION, SOLUTION INTRAMUSCULAR; INTRAVENOUS at 14:47

## 2023-02-01 RX ADMIN — CEFAZOLIN SODIUM 2 G: 2 SOLUTION INTRAVENOUS at 13:40

## 2023-02-01 RX ADMIN — ONDANSETRON 4 MG: 2 INJECTION INTRAMUSCULAR; INTRAVENOUS at 13:57

## 2023-02-01 RX ADMIN — HYDROMORPHONE HYDROCHLORIDE 0.2 MG: 1 INJECTION, SOLUTION INTRAMUSCULAR; INTRAVENOUS; SUBCUTANEOUS at 16:35

## 2023-02-01 RX ADMIN — BUPIVACAINE HYDROCHLORIDE AND EPINEPHRINE 10 ML: 5; 5 INJECTION, SOLUTION PERINEURAL at 13:33

## 2023-02-01 RX ADMIN — HYDROMORPHONE HYDROCHLORIDE 0.2 MG: 1 INJECTION, SOLUTION INTRAMUSCULAR; INTRAVENOUS; SUBCUTANEOUS at 16:27

## 2023-02-01 RX ADMIN — LIDOCAINE HYDROCHLORIDE 100 MG: 20 INJECTION, SOLUTION INFILTRATION; PERINEURAL at 13:50

## 2023-02-01 RX ADMIN — OXYCODONE HYDROCHLORIDE 5 MG: 5 TABLET ORAL at 16:03

## 2023-02-01 RX ADMIN — PROPOFOL 50 MG: 10 INJECTION, EMULSION INTRAVENOUS at 15:01

## 2023-02-01 RX ADMIN — SODIUM CHLORIDE, SODIUM LACTATE, POTASSIUM CHLORIDE, CALCIUM CHLORIDE: 600; 310; 30; 20 INJECTION, SOLUTION INTRAVENOUS at 13:46

## 2023-02-01 RX ADMIN — SODIUM CHLORIDE, SODIUM LACTATE, POTASSIUM CHLORIDE, CALCIUM CHLORIDE: 600; 310; 30; 20 INJECTION, SOLUTION INTRAVENOUS at 13:34

## 2023-02-01 RX ADMIN — SCOLOPAMINE TRANSDERMAL SYSTEM 1 PATCH: 1 PATCH, EXTENDED RELEASE TRANSDERMAL at 13:27

## 2023-02-01 RX ADMIN — PROPOFOL 150 MCG/KG/MIN: 10 INJECTION, EMULSION INTRAVENOUS at 13:48

## 2023-02-01 RX ADMIN — Medication 0.5 MG: at 15:17

## 2023-02-01 RX ADMIN — FENTANYL CITRATE 50 MCG: 50 INJECTION, SOLUTION INTRAMUSCULAR; INTRAVENOUS at 13:35

## 2023-02-01 RX ADMIN — DEXAMETHASONE SODIUM PHOSPHATE 4 MG: 4 INJECTION, SOLUTION INTRA-ARTICULAR; INTRALESIONAL; INTRAMUSCULAR; INTRAVENOUS; SOFT TISSUE at 13:57

## 2023-02-01 RX ADMIN — SODIUM CHLORIDE, SODIUM LACTATE, POTASSIUM CHLORIDE, CALCIUM CHLORIDE: 600; 310; 30; 20 INJECTION, SOLUTION INTRAVENOUS at 15:25

## 2023-02-01 RX ADMIN — KETOROLAC TROMETHAMINE 30 MG: 30 INJECTION, SOLUTION INTRAMUSCULAR; INTRAVENOUS at 15:13

## 2023-02-01 RX ADMIN — FENTANYL CITRATE 50 MCG: 50 INJECTION, SOLUTION INTRAMUSCULAR; INTRAVENOUS at 14:16

## 2023-02-01 NOTE — OP NOTE
PREOPERATIVE DIAGNOSES:   1. Left knee acute proximal patellar tendon rupture    POSTOPERATIVE DIAGNOSES:   1. Complete rupture of proximal patellar tendon with extension into medial and lateral retinaculum  2.  Pristine cartilage of the patella and trochlear groove as judged by open visualization    OPERATIONS:   1.  Left knee exam under anesthesia.   2.  Left knee repair of patella tendon rupture  3.  Left knee repair of medial and lateral patellar retinacular structure    Co-Surgeons: Mallorie Hanna MD ; Gurdeep Ivey MD  ANESTHESIA: General endotracheal anesthesia supplemented with adductor nerve block.      PROCEDURE: Under  General endotracheal anesthesia, the patient was positioned supine on the operating table. The patient's leg was prepped and draped in the usual sterile fashion. A pause was performed identifying the correct leg and the administration of antibiotics.    Visually, one could see the gap as a pucker in the skin at the proximal part of the patella tendon.  A midline skin incision was used from the mid patella to mid patella tendon region, centered on this visible defect.  This was carried down through the peritenon.  The ruptured tendon was readily identified.    The rupture was primarily at the proximal patella tendon and, with minimal tendon remaining on the distal patella pole.  The tear extended both medial and lateral to the the depth of the medial and lateral gutters.  The joint was visible once we went through the organized clot.  A brief visualization of the underneath surface of the patella and the trochlear groove identified no cartilaginous defects with pristine cartilage.    We began the procedure by denuding the distal patella back to a bleeding surface.  Using a drill bit, we placed 3 tunnels transosseous through the patella from distal patella to proximal patella.  These were spaced out to be medial central and lateral in the patella.  We placed silk shuttle sutures for  later use.    We then exposed the length of the patella tendon.  Using #2 FiberWire, we did a modified Van Sawyer type suture down the medial border of the patella tendon to its insertion on the tibial tubercle and back through the midportion of the patella exiting at the proximal patella rupture and.  We did a similar type of running suture down the lateral part of the patella tendon.    This gave us 4 suture ends emanating from the proximal patella tendon region.  We then shuttled these sutures through the 3 tunnels, placing 2 sutures in the central tunnel.    We then made sure that there was no soft tissue between the 2 ends of the medial and lateral suture, and tied the sutures tightly to themselves.  This was done with the knee in full extension.    There was a short segment of a periosteal sleeve distally that was kept attached to the distal patella.  We then placed this over the repaired proximal patella tendon.    Continuing to use #2 FiberWire, we closed the medial and lateral retinacular rent with a running suture.  As a last effort we used a #1 Vicryl to close as much of the peritenon as we could over the repaired patella tendon.    Wound was well irrigated.  Subcutaneous tissue was closed with 2-0 Vicryl the skin was closed with a running Monocryl Exofin surgical alginate and a Tegaderm was then placed on the wound.  A Tubigrip stockinette and a locked hinged brace was placed on the knee.    Patient was taken to recovery room in satisfactory condition.    Postoperative protocol will be as follows:  The patient hinged brace was placed at 20 degrees of flexion.  She can be maintained weightbearing as tolerates with a locked brace.  This will be for 2 weeks time.  She should be encouraged to do isometric quads and ankle pumps during this time    At the 2-week jeferson will open up her brace to do passive range of motion 0-50 for 2 weeks and then 0 to 90 degrees for 2 weeks more.  At the 6-week jeferson she will be  reevaluated for strength and motion, and at that point time she will be treated more functionally as she returns to full ADLs followed by athletic activities.    Gurdeep Cooley. MD, was an essential part of the case to help manage the limb in the OR, help with tissue retraction to ensure maximum exposure and maximum surgical efficiency, both which promotes best practice and best surgical outcomes.  There was no resdient learner of physician assistant  available for the case.      DONNIE KHAN MD

## 2023-02-01 NOTE — DISCHARGE INSTRUCTIONS
"[facility]  Home Care Following Anesthesia  For 24 hours after surgery:  Get plenty of rest.  A responsible adult must stay with you for at least 24 hours after you leave the surgery center.  Do not drive or use heavy equipment.  If you have weakness or tingling, don't drive or use heavy equipment until this feeling goes away.   Do not drink alcohol.   Avoid strenuous or risky activities.  Ask for help when climbing stairs.  You may feel lightheaded.  IF so, sit for a few minutes before standing.  Have someone help you get up.   If you have nausea (feel sick to your stomach): Drink only clear liquids such as apple juice, ginger ale, broth or 7-Up.  Rest may also help.  Be sure to drink enough fluids.  Move to a regular diet as you feel able.   You may have a slight fever.  Call the doctor if your fever is over 100 F (37.7 C) (taken under the tongue) or lasts longer than 24 hours.  You may have a dry mouth, a sore throat, muscle aches or trouble sleeping. These should go away after 24 hours.  Do not make important or legal decisions.   It is recommended to avoid smoking.        Today you received an Exparel block to numb the nerves near your surgery site.  This is a block using local anesthetic or \"numbing\" medication injected around the nerves to anesthetize or \"numb\" the area supplied by those nerves.  This block is injected into the muscle layer near your surgical site.  This medication may numb the location where you had surgery up to 72 hours.  If your surgical site is an arm or leg you should be careful with your affected limb, since it is possible to injure your limb without being aware of it due to the numbing.  Until full feeling returns, you should guard against bumping or hitting your limb, and avoid extreme hot or cold temperatures on the skin.  As the block wears off, the feeling will return as a tingling or prickly sensation near your surgical site.  You will " experince more discomfort from your incision as the feeling returns.  You may want to take a pain pill (a narcotic or Tylenol if this was prescribed by your surgeon) when you start to experience mild pain before the pain beomes more severe.  If your pain medications do not control your pain, you should notify your surgeon.    Tips for taking pain medications  To get the best pain relief possible, remember these points:  Take pain medications as directed, before pain becomes severe.  Pain medication can upset your stomach: taking it with food may help.  Constipation is a common side effect of pain medication. Drink plenty of  fluids.  Eat foods high in fiber. Take a stool softener if recommended by your doctor or pharmacist.  Do not drink alcohol, drive or operate machinery while taking pain medications.  Ask about other ways to control pain, such as with heat, ice or relaxation.    Tylenol/Acetaminophen Consumption  To help encourage the safe use of acetaminophen, the makers of TYLENOL  have lowered the maximum daily dose for single-ingredient Extra Strength TYLENOL  (acetaminophen) products sold in the [place] from 8 pills per day (4,000 mg) to 6 pills per day (3,000 mg). The dosing interval has also changed from 2 pills every 4-6 hours to 2 pills every 6 hours.  If you feel your pain relief is insufficient, you may take Tylenol/Acetaminophen in addition to your narcotic pain medication.   Be careful not to exceed 3,000 mg of Tylenol/Acetaminophen in a 24 hour period from all sources.  If you are taking extra strength Tylenol/acetaminophen (500 mg), the maximum dose is 6 tablets in 24 hours.  If you are taking regular strength acetaminophen (325 mg), the maximum dose is 9 tablets in 24 hours.    Call a doctor for any of the following:  Signs of infection (fever, growing tenderness at the surgery site, a large amount of drainage or bleeding, severe pain, foul-smelling drainage, redness, swelling).  It has been over 8  to 10 hours since surgery and you are still not able to urinate (pass water).  Headache for over 24 hours.  Numbness, tingling or weakness the day after surgery (if you had spinal anesthesia).  Signs of Covid-19 infection (temperature over 100 degrees, shortness of breath, cough, loss of taste/smell, generalized body aches, persistent headache, chills, sore throat, nausea/vomiting/diarrhea)  Your doctor is:  Dr. Mallorie Hanna, Orthopaedics: [phone]                    Or dial [phone] and ask for the resident on call for:  Orthopaedics  For emergency care, call the:  [facility] Emergency Department: [phone] (TTY for hearing impaired: [phone])

## 2023-02-01 NOTE — OR NURSING
Patient received left side Adductor nerve block  with Exparel.  Fentanyl 50mcg and Versed 2mg given. Tolerated procedure well.

## 2023-02-01 NOTE — ANESTHESIA PROCEDURE NOTES
Adductor canal Procedure Note    Pre-Procedure   Staff -        Anesthesiologist:  Tin Armenta MD       Resident/Fellow: Mychal Padilla MD       Performed By: anesthesiologist and resident       Location: pre-op       Procedure Start/Stop Times: 2/1/2023 1:33 PM and 2/1/2023 1:40 PM       Pre-Anesthestic Checklist: patient identified, IV checked, site marked, risks and benefits discussed, informed consent, monitors and equipment checked, pre-op evaluation, at physician/surgeon's request and post-op pain management  Timeout:       Correct Patient: Yes        Correct Procedure: Yes        Correct Site: Yes        Correct Position: Yes        Correct Laterality: Yes        Site Marked: Yes  Procedure Documentation  Procedure: Adductor canal       Laterality: left       Patient Position: supine       Skin prep: Chloraprep                Ultrasound guided       1. Ultrasound was used to identify targeted nerve, plexus, vascular marker, or fascial plane and place a needle adjacent to it in real-time.       2. Ultrasound was used to visualize the spread of anesthetic in close proximity to the above referenced structure.       3. A permanent image is entered into the patient's record.       4. The visualized anatomic structures appeared normal.    Assessment/Narrative         The placement was positive for aspirated blood.       The placement was negative for: painful injection and site bleeding       Paresthesias: Resolved and Yes.       Bolus given via needle. no blood aspirated via catheter.        Secured via.        Insertion/Infusion Method: Single Shot       Injection made incrementally with aspirations every 3 mL.    Medication(s) Administered   Bupivacaine 0.5% w/ 1:200K Epi (Other) - Other   10 mL - 2/1/2023 1:33:00 PM  Bupivacaine liposome (Exparel) 1.3% LA inj susp (Infiltration) - Infiltration   10 mL - 2/1/2023 1:33:00 PM  Medication Administration Time: 2/1/2023 1:33 PM     Comments:  133  "exparel      FOR [facility]) ONLY:   Pain Team Contact information: please page the Pain Team Via [facility]. Search \"Pain\". During daytime hours, please page the attending first. At night please page the resident first.    "

## 2023-02-01 NOTE — ANESTHESIA POSTPROCEDURE EVALUATION
Patient: Nika CHAPIN Radha    Procedure: Procedure(s):  left knee arthrotomy, patella tendon repair and petelar retinaculum repair       Anesthesia Type:  General    Note:  Disposition: Outpatient   Postop Pain Control: Challenging            Challenges/Interventions: Acute Pain; Block failure; Multimodal therapy   PONV:    Neuro/Psych: Uneventful            Sign Out: Acceptable/Baseline neuro status   Airway/Respiratory: Uneventful            Sign Out: Acceptable/Baseline resp. status   CV/Hemodynamics: Uneventful            Sign Out: Acceptable CV status; No obvious hypovolemia; No obvious fluid overload   Other NRE:    DID A NON-ROUTINE EVENT OCCUR?            Last vitals:  Vitals Value Taken Time   /61 02/01/23 1645   Temp 36.6  C (97.9  F) 02/01/23 1635   Pulse 86 02/01/23 1653   Resp 17 02/01/23 1653   SpO2 98 % 02/01/23 1653   Vitals shown include unvalidated device data.    Electronically Signed By: Hany Soria MD  February 1, 2023  4:55 PM

## 2023-02-01 NOTE — ANESTHESIA PREPROCEDURE EVALUATION
Anesthesia Pre-Procedure Evaluation    Patient: Nika Garcia   MRN: [id number] : 1995        Procedure : Procedure(s):  left knee arthrotomy, patella tendon repair          History reviewed. No pertinent past medical history.   History reviewed. No pertinent surgical history.   Allergies   Allergen Reactions     Cats Itching      Social History     Tobacco Use     Smoking status: Never     Smokeless tobacco: Never   Substance Use Topics     Alcohol use: No      Wt Readings from Last 1 Encounters:   23 81.6 kg (180 lb)        Anesthesia Evaluation            ROS/MED HX  ENT/Pulmonary:  - neg pulmonary ROS     Neurologic:  - neg neurologic ROS     Cardiovascular:  - neg cardiovascular ROS     METS/Exercise Tolerance:     Hematologic:  - neg hematologic  ROS     Musculoskeletal:       GI/Hepatic:  - neg GI/hepatic ROS     Renal/Genitourinary:  - neg Renal ROS     Endo:  - neg endo ROS     Psychiatric/Substance Use:  - neg psychiatric ROS     Infectious Disease:  - neg infectious disease ROS     Malignancy:  - neg malignancy ROS     Other:               OUTSIDE LABS:  CBC:   Lab Results   Component Value Date    WBC 3.2 (L) 2014    HGB 13.5 2016    HGB 13.6 2015    HGB 13.6 2015    HCT 40.9 2014     2014     BMP: No results found for: NA, POTASSIUM, CHLORIDE, CO2, BUN, CR, GLC  COAGS: No results found for: PTT, INR, FIBR  POC: No results found for: BGM, HCG, HCGS  HEPATIC: No results found for: ALBUMIN, PROTTOTAL, ALT, AST, GGT, ALKPHOS, BILITOTAL, BILIDIRECT, ELIN  OTHER: No results found for: PH, LACT, A1C, AIDE, PHOS, MAG, LIPASE, AMYLASE, TSH, T4, T3, CRP, SED    Anesthesia Plan    ASA Status:  1      Anesthesia Type: General.     - Airway: LMA              Consents    Anesthesia Plan(s) and associated risks, benefits, and realistic alternatives discussed. Questions answered and patient/representative(s) expressed understanding.     - Discussed: Risks,  Benefits and Alternatives for BOTH SEDATION and the PROCEDURE were discussed     - Discussed with:  Patient      - Extended Intubation/Ventilatory Support Discussed: No.      - Patient is DNR/DNI Status: No    Use of blood products discussed: No .     Postoperative Care    Pain management: IV analgesics, Oral pain medications.   PONV prophylaxis: Ondansetron (or other 5HT-3), Dexamethasone or Solumedrol     Comments:                Tin Armenta MD, MD

## 2023-02-01 NOTE — BRIEF OP NOTE
[facility]    Brief Operative Note    Pre-operative diagnosis: Rupture of left patellar tendon, initial encounter [K35.975Y]  Post-operative diagnosis Same as pre-operative diagnosis    Procedure: Procedure(s):  left knee arthrotomy, patella tendon repair and petelar retinaculum repair  Surgeon: Surgeon(s) and Role:     * Mallorie Hanna MD - Primary     * Dustin Aldrich MD - Assisting  Anesthesia: General with Block   Estimated Blood Loss: 75mL    Drains: None  Specimens: * No specimens in log *  Findings:   Please see op note.  Complications: None.  Implants: * No implants in log *    Postop Plan: Disposition: DC Home                        Weight Bearing: WBAT LLE                        Brace: HKB to remain locked in extension at ALL times                        Pain Meds: Multimodal pain regimen                        Chemical DVT Prophylaxis: ASA 162mg every day x28 days                         Abx: None                        F/u: 2 weeks with either Dr. Aldrich or Dr. Cyndi Mcbride MD  Orthopaedic Surgery, PGY-5

## 2023-02-01 NOTE — ANESTHESIA CARE TRANSFER NOTE
Patient: Nika Garcia    Procedure: Procedure(s):  left knee arthrotomy, patella tendon repair and petelar retinaculum repair       Diagnosis: Rupture of left patellar tendon, initial encounter [S86.130S]  Diagnosis Additional Information: No value filed.    Anesthesia Type:   General     Note:    Oropharynx: oropharynx clear of all foreign objects  Level of Consciousness: awake  Oxygen Supplementation: room air    Independent Airway: airway patency satisfactory and stable  Dentition: dentition unchanged  Vital Signs Stable: post-procedure vital signs reviewed and stable  Report to RN Given: handoff report given  Patient transferred to: Phase II  Comments: VSS and WNL, comfortable, no PONV, report to Yulia LARIOS  Handoff Report: Identifed the Patient, Identified the Reponsible Provider, Reviewed the pertinent medical history, Discussed the surgical course, Reviewed Intra-OP anesthesia mangement and issues during anesthesia, Set expectations for post-procedure period and Allowed opportunity for questions and acknowledgement of understanding      Vitals:  Vitals Value Taken Time   /77 02/01/23 1545   Temp     Pulse 96 02/01/23 1547   Resp 21 02/01/23 1547   SpO2 91 % 02/01/23 1547   Vitals shown include unvalidated device data.    Electronically Signed By: VIANNA Pierce CRNA  February 1, 2023  3:48 PM

## 2023-02-02 ENCOUNTER — TELEPHONE (OUTPATIENT)
Dept: ORTHOPEDICS | Facility: CLINIC | Age: 28
End: 2023-02-02
Payer: COMMERCIAL

## 2023-02-02 NOTE — TELEPHONE ENCOUNTER
I left a message for the patient stating that we scheduled her for a post op appointment with Dr. Hanna's PA on Friday Feb. 3rd at 3pm. Call back number was given to reschedule if this day/time does not work for the patient.     BELINDA Gonzalez

## 2023-02-03 DIAGNOSIS — S86.812A RUPTURE OF LEFT PATELLAR TENDON, INITIAL ENCOUNTER: Primary | ICD-10-CM

## 2023-02-03 DIAGNOSIS — S86.812A RUPTURE OF LEFT PATELLAR TENDON, INITIAL ENCOUNTER: ICD-10-CM

## 2023-02-03 RX ORDER — OXYCODONE HYDROCHLORIDE 5 MG/1
5-10 TABLET ORAL EVERY 4 HOURS PRN
Qty: 26 TABLET | Refills: 0 | Status: SHIPPED | OUTPATIENT
Start: 2023-02-03

## 2023-02-03 RX ORDER — ONDANSETRON 4 MG/1
4 TABLET, ORALLY DISINTEGRATING ORAL EVERY 6 HOURS PRN
Qty: 30 TABLET | Refills: 0 | Status: SHIPPED | OUTPATIENT
Start: 2023-02-03

## 2023-02-03 RX ORDER — OXYCODONE HYDROCHLORIDE 5 MG/1
5 TABLET ORAL EVERY 4 HOURS PRN
Qty: 26 TABLET | Refills: 0 | Status: CANCELLED | OUTPATIENT
Start: 2023-02-03 | End: 2023-02-07

## 2023-02-03 NOTE — TELEPHONE ENCOUNTER
Writer consulted with KARAN Crooks on pain relief medication.     Cynthia agreed since it was one occurrence, to continue Oxycodone taken with food and Zofran PRN.  Patient aware to call us if symptoms are unresolved.    Medication refill routed to Cynthia for signature.    Tamar Lozano RN on 2/3/2023 at 1:38 PM

## 2023-02-03 NOTE — TELEPHONE ENCOUNTER
Phoned patent per Dr. Hanna request.  Patient is experiencing post-op N/V.    Patient is s/p left knee arthrotomy, patella tendon repair and petelar retinaculum repair DOS 2/1/23.    Patient reports she woke up early this morning and as soon as she stood up she got nauseous and vomited.  The last dose of oxycodone she had was at 4am, she took without food. Patient reports she has been taking 1 tab q 4 hours prn for pain relief.  Patient reports she is running out of Oxycodone and only has about 6 tabs left which won't last her the weekend.    Per Dr. Hanna, OK to prescribe Zofran for n/v.  Patient reports she needs a refill of pain medication as well. Writer will consult with Dr. Hanna if she is OK with ordering more oxycodone or if she would like to try a different pain relief medication.  Writer advised to eat small meals with pain medications to avoid stomach upset.    Tamar Lozano RN on 2/3/2023 at 12:05 PM

## 2023-02-14 ENCOUNTER — DOCUMENTATION ONLY (OUTPATIENT)
Dept: FAMILY MEDICINE | Facility: CLINIC | Age: 28
End: 2023-02-14

## 2023-02-14 NOTE — PROGRESS NOTES
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 Occurred with single leg jump.     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 She immediately flew home in order to seek care here. Since surgery, she has been doing better. Struggled with significant nausea, fatigue, some night sweats and pain post surgery. All these symptoms have since resolved.     O:   Surgery: Left patellar tendon repair; retinacular repair  Surgeon: Dr. Hanna ; Dr. Aldrich   DOS: 2/1/2023 1/30/23 - Pre op   Joint Line Measure Joint Line 3\" 6\" 9\"   Left 16\" 16.5\" 19.25\" 21.75\"   Right 15.75\" 17\" 19.5\" 23\"     2/10/23    Joint Line Measure Joint Line 3\" 6\" 9\"   Left 17\" 16.5\" 18.25\" 21\"   Right 15\" 16.75\" 19.5\" 22.5\"       L ROM: 0-0-30 on todays visit; have not pushed into hyperextension but does appear range of motion has been maintained; no pain, tightness or discomfort at 30 degrees of flexion today    -Week 2-4: ROM 0-50   -Week 4-6: ROM 0-90       R ROM: 6-0-149    WB status: WBAT with brace locked at 20 degrees; significant hip hike to clear foot. Does not feel comfortable with full WB on involved extremity. Improved with weight shift exercises. Understands and can recite her precautions at this point.     Poor quad activation present; significant atrophy noted visually ; initiated very light NMES on L quadriceps - avoiding rectus and focusing more medially and laterally on thigh.    Incision: clean, dry and intact; has been covered by " nonadherent gauze and tubigrip most of the time. Encouraged to allow incision to receive air when sitting around at home.     A: Progressing appropriately at 2 weeks post op above procedure. Nika is currently very fearful of full weight bearing and activating quadriceps in thoughts of jeopardizing repair. Nika was assured that current home program will not affect repair that was performed. Nika demonstrated significant improvement with global knee effusion upon todays visit. No warning signs present of injection, DVT, or other serious underlying issues.     P: Continue to emphasize compression, elevation and quadriceps activation while at home. Will continue to follow up in volleyball ATR with Moise, myself, and Óscar for continued evaluation prior to flying back to [facility] where she currently lives full time.     Jose Ramon Hammonds, PT

## 2023-02-27 DIAGNOSIS — S86.812A RUPTURE OF LEFT PATELLAR TENDON, INITIAL ENCOUNTER: Primary | ICD-10-CM

## 2023-02-28 ENCOUNTER — ANCILLARY PROCEDURE (OUTPATIENT)
Dept: GENERAL RADIOLOGY | Facility: CLINIC | Age: 28
End: 2023-02-28
Attending: ORTHOPAEDIC SURGERY
Payer: COMMERCIAL

## 2023-02-28 ENCOUNTER — OFFICE VISIT (OUTPATIENT)
Dept: ORTHOPEDICS | Facility: CLINIC | Age: 28
End: 2023-02-28
Payer: COMMERCIAL

## 2023-02-28 DIAGNOSIS — S86.812A RUPTURE OF LEFT PATELLAR TENDON, INITIAL ENCOUNTER: Primary | ICD-10-CM

## 2023-02-28 DIAGNOSIS — S86.812A RUPTURE OF LEFT PATELLAR TENDON, INITIAL ENCOUNTER: ICD-10-CM

## 2023-02-28 PROCEDURE — 73560 X-RAY EXAM OF KNEE 1 OR 2: CPT | Mod: LT | Performed by: RADIOLOGY

## 2023-02-28 PROCEDURE — 99024 POSTOP FOLLOW-UP VISIT: CPT | Performed by: ORTHOPAEDIC SURGERY

## 2023-02-28 NOTE — NURSING NOTE
Reason For Visit:   Chief Complaint   Patient presents with     RECHECK     4 weeks pop DOS 2/1/23 left knee arthrotomy, patella tendon repair and petelar retinaculum repair        Primary MD: Bandar Schultz  Ref. MD: EST    ?  No  Occupation [employer].  Currently working? Yes.  Work status?  Full time.     Date of injury: 1/27/2023  Type of injury: sport injury.     Date of surgery: 2/1/2023  Type of surgery: left knee arthrotomy, patella tendon repair and petelar retinaculum repair     Smoker: No  Request smoking cessation information: No    There were no vitals taken for this visit.    Pain Assessment  Patient Currently in Pain: Gloria Lambert LPN

## 2023-02-28 NOTE — PROGRESS NOTES
Patient is a 27-year-old female who is now 1 month status post a left patella tendon repair secondary to an acute injury.  She lives in [place] but has been staying in [facility] [place] with her parents.  She has been doing physical therapy with the intercollegiate volleyball  and our physical therapist onsite in the training room.    She has been walking with her brace locked for 2 weeks and a small degree of flexion and then we opened it up 0-30 and allowed full weightbearing.  Despite this she had a hard time doing full weightbearing mainly due to apprehension.    Range of motion has been to tolerance without any manual passive force, best effort with range of motion today has been 45 degrees.    In regards to pain she has minimal pain.    Physical exam of the patient reveals benign-appearing incisions.  Patient has a fair quad contraction.  She is not able to do a straight leg raise in the supine position is able to do a straight leg in the upright position.  When trying to execute a short arc quad she has an improved quad contraction.  She has mild superior migration of the patella with the quad contraction.    Palpation of the patella tendon is satisfactory without pain or any palpable defect particularly proximal in the patella tendon    Imaging: A single sagittal x-ray was performed.  It reveals the patella with a C/D index of 0.85 compared to 1.15 preop.  I/S measures 0.91    A trial of walking with the brace open to 60 degrees and trying to establish a heel-toe gait showed fair ability to walk with a single crutch.    Assessment: Mild patella baja now 1 month status post patella tendon repair.  Fair motion and fair quad strength at this point postop    Plan: Patient will proceed to a more aggressive rehab program including the followin.  Double leg partial squats proceeding to single-leg partial squat as tolerates  2.  Walking with the brace opened approximately 10 degrees greater  than her passive range of motion allows.  We can proceed with opening the brace as function allows.  However I would discourage tandem stair climbing at this point in time.  3.  Use of a quad stimulator such as knee hab or Compac([employer])  4.  Stand up hips in 4 planes.  5.  I would discourage any open chain activities and repetition except short arc quads.    Patient will be in [place] approximately 2 more weeks.  I will be out of town at that time and she will arrange to see Dr. Cooley and our physical therapist prior to leaving.    This is a postop visit.    Mallorie Hanna MD  Professor Orthopedic Surgery  [facility]

## 2023-02-28 NOTE — LETTER
2/28/2023         RE: Nika Garcia  [address]  [place] [address]        Dear Colleague,    Thank you for referring your patient, Nika Garcia, to the [facility] [place]. Please see a copy of my visit note below.    Patient is a 27-year-old female who is now 1 month status post a left patella tendon repair secondary to an acute injury.  She lives in [place] but has been staying in [facility] with her parents.  She has been doing physical therapy with the intercollegiate volleyball  and our physical therapist onsite in the training room.    She has been walking with her brace locked for 2 weeks and a small degree of flexion and then we opened it up 0-30 and allowed full weightbearing.  Despite this she had a hard time doing full weightbearing mainly due to apprehension.    Range of motion has been to tolerance without any manual passive force, best effort with range of motion today has been 45 degrees.    In regards to pain she has minimal pain.    Physical exam of the patient reveals benign-appearing incisions.  Patient has a fair quad contraction.  She is not able to do a straight leg raise in the supine position is able to do a straight leg in the upright position.  When trying to execute a short arc quad she has an improved quad contraction.  She has mild superior migration of the patella with the quad contraction.    Palpation of the patella tendon is satisfactory without pain or any palpable defect particularly proximal in the patella tendon    Imaging: A single sagittal x-ray was performed.  It reveals the patella with a C/D index of 0.85 compared to 1.15 preop.  I/S measures 0.91    A trial of walking with the brace open to 60 degrees and trying to establish a heel-toe gait showed fair ability to walk with a single crutch.    Assessment: Mild patella baja now 1 month status post patella tendon repair.  Fair motion and fair quad strength at  this point postop    Plan: Patient will proceed to a more aggressive rehab program including the followin.  Double leg partial squats proceeding to single-leg partial squat as tolerates  2.  Walking with the brace opened approximately 10 degrees greater than her passive range of motion allows.  We can proceed with opening the brace as function allows.  However I would discourage tandem stair climbing at this point in time.  3.  Use of a quad stimulator such as knee hab or Compac([employer])  4.  Stand up hips in 4 planes.  5.  I would discourage any open chain activities and repetition except short arc quads.    Patient will be in [place] approximately 2 more weeks.  I will be out of town at that time and she will arrange to see Dr. Cooley and our physical therapist prior to leaving.    This is a postop visit.    Mallorie Hanna MD  Professor Orthopedic Surgery  [facility]

## 2023-03-23 ENCOUNTER — TRANSFERRED RECORDS (OUTPATIENT)
Dept: HEALTH INFORMATION MANAGEMENT | Facility: CLINIC | Age: 28
End: 2023-03-23
Payer: COMMERCIAL

## 2023-03-28 ENCOUNTER — APPOINTMENT (RX ONLY)
Dept: URBAN - METROPOLITAN AREA CLINIC 7 | Facility: CLINIC | Age: 28
Setting detail: DERMATOLOGY
End: 2023-03-28

## 2023-03-28 DIAGNOSIS — L70.0 ACNE VULGARIS: ICD-10-CM

## 2023-03-28 DIAGNOSIS — Z41.9 ENCOUNTER FOR PROCEDURE FOR PURPOSES OTHER THAN REMEDYING HEALTH STATE, UNSPECIFIED: ICD-10-CM

## 2023-03-28 PROCEDURE — ? PRESCRIPTION MEDICATION MANAGEMENT

## 2023-03-28 PROCEDURE — ? TCA CHEMICAL PEEL

## 2023-03-28 PROCEDURE — ? COUNSELING

## 2023-03-28 PROCEDURE — 99203 OFFICE O/P NEW LOW 30 MIN: CPT

## 2023-03-28 PROCEDURE — ? PRESCRIPTION

## 2023-03-28 RX ORDER — SODIUM SULFACETAMIDE AND SULFUR 80; 40 MG/ML; MG/ML
LOTION TOPICAL BID
Qty: 473 | Refills: 0 | Status: ERX | COMMUNITY
Start: 2023-03-28

## 2023-03-28 RX ORDER — CLINDAMYCIN PHOSPHATE 10 MG/G
GEL TOPICAL QAM
Qty: 60 | Refills: 0 | Status: ERX | COMMUNITY
Start: 2023-03-28

## 2023-03-28 RX ADMIN — CLINDAMYCIN PHOSPHATE: 10 GEL TOPICAL at 00:00

## 2023-03-28 RX ADMIN — SODIUM SULFACETAMIDE AND SULFUR: 80; 40 LOTION TOPICAL at 00:00

## 2023-03-28 ASSESSMENT — LOCATION SIMPLE DESCRIPTION DERM
LOCATION SIMPLE: RIGHT LIP
LOCATION SIMPLE: LEFT CHEEK
LOCATION SIMPLE: RIGHT CHEEK
LOCATION SIMPLE: INFERIOR FOREHEAD

## 2023-03-28 ASSESSMENT — LOCATION DETAILED DESCRIPTION DERM
LOCATION DETAILED: INFERIOR MID FOREHEAD
LOCATION DETAILED: LEFT INFERIOR CENTRAL MALAR CHEEK
LOCATION DETAILED: RIGHT CENTRAL MALAR CHEEK
LOCATION DETAILED: RIGHT LOWER CUTANEOUS LIP

## 2023-03-28 ASSESSMENT — LOCATION ZONE DERM
LOCATION ZONE: LIP
LOCATION ZONE: FACE

## 2023-03-28 NOTE — PROCEDURE: PRESCRIPTION MEDICATION MANAGEMENT
Render In Strict Bullet Format?: No
Initiate Treatment: Sulfa cleanser 8-4% QAM\\nClindamycin gel every morning
Continue Regimen: Tretinoin cream  0.25% PCP Proscribed
Detail Level: Zone

## 2023-03-28 NOTE — PROCEDURE: TCA CHEMICAL PEEL
Post Peel Care: After the procedure, the treatment area was washed with soap and water, and a post-peel cream was applied. Sun protection and post-care instructions were reviewed with the patient.  Peel done by MO
Detail Level: Zone
Number Of Coats: 2
Treatment Number: 0
Consent: Prior to the procedure, written consent was obtained and risks were reviewed, including but not limited to: redness, peeling, blistering, pigmentary change, scarring, infection, and pain.
Prep: The treated area was degreased with pre-peel cleanser, and vaseline was applied for protection of mucous membranes.
Chemical Peel: 10% TCA
Post-Care Instructions: I reviewed with the patient in detail post-care instructions. Patient should avoid sun exposure and wear sun protection.

## 2023-03-28 NOTE — PROCEDURE: MIPS QUALITY
Quality 226: Preventive Care And Screening: Tobacco Use: Screening And Cessation Intervention: Patient screened for tobacco use and is an ex/non-smoker
Quality 130: Documentation Of Current Medications In The Medical Record: Current Medications Documented
Quality 431: Preventive Care And Screening: Unhealthy Alcohol Use - Screening: Patient did not receive brief counseling if identified as an unhealthy alcohol user
Detail Level: Zone

## 2023-04-03 ENCOUNTER — TELEPHONE (OUTPATIENT)
Dept: ORTHOPEDICS | Facility: CLINIC | Age: 28
End: 2023-04-03
Payer: COMMERCIAL

## 2023-04-03 NOTE — TELEPHONE ENCOUNTER
[facility] Call Center    Phone Message    May a detailed message be left on voicemail: yes     Reason for Call: Other: Patient's physical therapist, Grayson would like to know her current restrictions and when to begin resisted open change strengthening.  OK to call or text Grayson back.     Action Taken: Message routed to:  [facility] Orthopedics    Travel Screening: Not Applicable

## 2023-04-04 ENCOUNTER — TRANSFERRED RECORDS (OUTPATIENT)
Dept: HEALTH INFORMATION MANAGEMENT | Facility: CLINIC | Age: 28
End: 2023-04-04
Payer: COMMERCIAL

## 2023-04-05 ENCOUNTER — DOCUMENTATION ONLY (OUTPATIENT)
Dept: ORTHOPEDICS | Facility: CLINIC | Age: 28
End: 2023-04-05
Payer: COMMERCIAL

## 2023-04-05 NOTE — PROGRESS NOTES
Received Completed forms Yes   Faxed Forms Faxed To: [facility] of sport  Fax Number: [phone]   Sent to HIM (Date) 4/4/23

## 2023-04-05 NOTE — PROGRESS NOTES
Received Completed forms Yes   Faxed Forms Faxed To: [facility] of sport  Fax Number: [phone]   Sent to HIM (Date) 4/4/23

## 2023-04-18 ENCOUNTER — APPOINTMENT (RX ONLY)
Dept: URBAN - METROPOLITAN AREA CLINIC 7 | Facility: CLINIC | Age: 28
Setting detail: DERMATOLOGY
End: 2023-04-18

## 2023-04-18 DIAGNOSIS — L70.0 ACNE VULGARIS: ICD-10-CM

## 2023-04-18 PROCEDURE — ? PRESCRIPTION MEDICATION MANAGEMENT

## 2023-04-18 PROCEDURE — ? COUNSELING

## 2023-04-18 PROCEDURE — 99214 OFFICE O/P EST MOD 30 MIN: CPT

## 2023-04-18 PROCEDURE — ? PRESCRIPTION

## 2023-04-18 RX ORDER — TRETIONIN 1 MG/G
CREAM TOPICAL
Qty: 20 | Refills: 0 | Status: ERX

## 2023-04-18 RX ORDER — TRETIONIN 1 MG/G
CREAM TOPICAL
Qty: 20 | Refills: 0 | Status: ERX | COMMUNITY
Start: 2023-04-18

## 2023-04-18 RX ORDER — CLASCOTERONE 1 G/100G
CREAM TOPICAL
Qty: 60 | Refills: 0 | Status: ERX | COMMUNITY
Start: 2023-04-18

## 2023-04-18 RX ORDER — CLASCOTERONE 1 G/100G
CREAM TOPICAL
Qty: 60 | Refills: 0 | Status: ERX

## 2023-04-18 RX ADMIN — TRETIONIN: 1 CREAM TOPICAL at 00:00

## 2023-04-18 RX ADMIN — CLASCOTERONE: 1 CREAM TOPICAL at 00:00

## 2023-04-18 NOTE — PROCEDURE: PRESCRIPTION MEDICATION MANAGEMENT
Render In Strict Bullet Format?: No
Initiate Treatment: Tretinoin 0.01% cream \\nWinlevi cream
Continue Regimen: Sulfa cleanser 8-4% QAM\\nClindamycin gel every morning
Detail Level: Zone

## 2023-04-18 NOTE — PROCEDURE: COUNSELING
High Dose Vitamin A Pregnancy And Lactation Text: High dose vitamin A therapy is contraindicated during pregnancy and breast feeding.
Sarecycline Pregnancy And Lactation Text: This medication is Pregnancy Category D and not consider safe during pregnancy. It is also excreted in breast milk.
Spironolactone Pregnancy And Lactation Text: This medication can cause feminization of the male fetus and should be avoided during pregnancy. The active metabolite is also found in breast milk.
Aklief Pregnancy And Lactation Text: It is unknown if this medication is safe to use during pregnancy. It is unknown if this medication is excreted in breast milk. Breastfeeding women should use the topical cream on the smallest area of the skin for the shortest time needed while breastfeeding. Do not apply to nipple and areola.
Azithromycin Counseling:  I discussed with the patient the risks of azithromycin including but not limited to GI upset, allergic reaction, drug rash, diarrhea, and yeast infections.
Birth Control Pills Counseling: Birth Control Pill Counseling: I discussed with the patient the potential side effects of OCPs including but not limited to increased risk of stroke, heart attack, thrombophlebitis, deep venous thrombosis, hepatic adenomas, breast changes, GI upset, headaches, and depression. The patient verbalized understanding of the proper use and possible adverse effects of OCPs. All of the patient's questions and concerns were addressed.
Bactrim Pregnancy And Lactation Text: This medication is Pregnancy Category D and is known to cause fetal risk. It is also excreted in breast milk.
Azelaic Acid Counseling: Patient counseled that medicine may cause skin irritation and to avoid applying near the eyes. In the event of skin irritation, the patient was advised to reduce the amount of the drug applied or use it less frequently. The patient verbalized understanding of the proper use and possible adverse effects of azelaic acid. All of the patient's questions and concerns were addressed.
Dapsone Pregnancy And Lactation Text: This medication is Pregnancy Category C and is not considered safe during pregnancy or breast feeding.
Topical Retinoid counseling:  Patient advised to apply a pea-sized amount only at bedtime and wait 30 minutes after washing their face before applying. If too drying, patient may add a non-comedogenic moisturizer. The patient verbalized understanding of the proper use and possible adverse effects of retinoids. All of the patient's questions and concerns were addressed.
Include Pregnancy/Lactation Warning?: No
Erythromycin Pregnancy And Lactation Text: This medication is Pregnancy Category B and is considered safe during pregnancy. It is also excreted in breast milk.
Topical Clindamycin Counseling: Patient counseled that this medication may cause skin irritation or allergic reactions. In the event of skin irritation, the patient was advised to reduce the amount of the drug applied or use it less frequently. The patient verbalized understanding of the proper use and possible adverse effects of clindamycin. All of the patient's questions and concerns were addressed.
Doxycycline Pregnancy And Lactation Text: This medication is Pregnancy Category D and not consider safe during pregnancy. It is also excreted in breast milk but is considered safe for shorter treatment courses.
Tazorac Counseling:  Patient advised that medication is irritating and drying. Patient may need to apply sparingly and wash off after an hour before eventually leaving it on overnight. The patient verbalized understanding of the proper use and possible adverse effects of tazorac. All of the patient's questions and concerns were addressed.
Isotretinoin Pregnancy And Lactation Text: This medication is Pregnancy Category X and is considered extremely dangerous during pregnancy. It is unknown if it is excreted in breast milk.
Winlevi Counseling:  I discussed with the patient the risks of topical clascoterone including but not limited to erythema, scaling, itching, and stinging. Patient voiced their understanding.
Topical Sulfur Applications Counseling: Topical Sulfur Counseling: Patient counseled that this medication may cause skin irritation or allergic reactions. In the event of skin irritation, the patient was advised to reduce the amount of the drug applied or use it less frequently. The patient verbalized understanding of the proper use and possible adverse effects of topical sulfur application. All of the patient's questions and concerns were addressed.
Detail Level: Zone
Winlevi Pregnancy And Lactation Text: This medication is considered safe during pregnancy and breastfeeding.
Sarecycline Counseling: Patient advised regarding possible photosensitivity and discoloration of the teeth, skin, lips, tongue and gums. Patient instructed to avoid sunlight, if possible. When exposed to sunlight, patients should wear protective clothing, sunglasses, and sunscreen. The patient was instructed to call the office immediately if the following severe adverse effects occur:  hearing changes, easy bruising/bleeding, severe headache, or vision changes. The patient verbalized understanding of the proper use and possible adverse effects of sarecycline. All of the patient's questions and concerns were addressed.
Tetracycline Counseling: Patient counseled regarding possible photosensitivity and increased risk for sunburn. Patient instructed to avoid sunlight, if possible. When exposed to sunlight, patients should wear protective clothing, sunglasses, and sunscreen. The patient was instructed to call the office immediately if the following severe adverse effects occur:  hearing changes, easy bruising/bleeding, severe headache, or vision changes. The patient verbalized understanding of the proper use and possible adverse effects of tetracycline. All of the patient's questions and concerns were addressed. Patient understands to avoid pregnancy while on therapy due to potential birth defects.
Spironolactone Counseling: Patient advised regarding risks of diarrhea, abdominal pain, hyperkalemia, birth defects (for female patients), liver toxicity and renal toxicity. The patient may need blood work to monitor liver and kidney function and potassium levels while on therapy. The patient verbalized understanding of the proper use and possible adverse effects of spironolactone. All of the patient's questions and concerns were addressed.
Azithromycin Pregnancy And Lactation Text: This medication is considered safe during pregnancy and is also secreted in breast milk.
Aklief counseling:  Patient advised to apply a pea-sized amount only at bedtime and wait 30 minutes after washing their face before applying. If too drying, patient may add a non-comedogenic moisturizer. The most commonly reported side effects including irritation, redness, scaling, dryness, stinging, burning, itching, and increased risk of sunburn. The patient verbalized understanding of the proper use and possible adverse effects of retinoids. All of the patient's questions and concerns were addressed.
Benzoyl Peroxide Counseling: Patient counseled that medicine may cause skin irritation and bleach clothing. In the event of skin irritation, the patient was advised to reduce the amount of the drug applied or use it less frequently. The patient verbalized understanding of the proper use and possible adverse effects of benzoyl peroxide. All of the patient's questions and concerns were addressed.
Birth Control Pills Pregnancy And Lactation Text: This medication should be avoided if pregnant and for the first 30 days post-partum.
Azelaic Acid Pregnancy And Lactation Text: This medication is considered safe during pregnancy and breast feeding.
Bactrim Counseling:  I discussed with the patient the risks of sulfa antibiotics including but not limited to GI upset, allergic reaction, drug rash, diarrhea, dizziness, photosensitivity, and yeast infections. Rarely, more serious reactions can occur including but not limited to aplastic anemia, agranulocytosis, methemoglobinemia, blood dyscrasias, liver or kidney failure, lung infiltrates or desquamative/blistering drug rashes.
Topical Retinoid Pregnancy And Lactation Text: This medication is Pregnancy Category C. It is unknown if this medication is excreted in breast milk.
Doxycycline Counseling:  Patient counseled regarding possible photosensitivity and increased risk for sunburn. Patient instructed to avoid sunlight, if possible. When exposed to sunlight, patients should wear protective clothing, sunglasses, and sunscreen. The patient was instructed to call the office immediately if the following severe adverse effects occur:  hearing changes, easy bruising/bleeding, severe headache, or vision changes. The patient verbalized understanding of the proper use and possible adverse effects of doxycycline. All of the patient's questions and concerns were addressed.
Dapsone Counseling: I discussed with the patient the risks of dapsone including but not limited to hemolytic anemia, agranulocytosis, rashes, methemoglobinemia, kidney failure, peripheral neuropathy, headaches, GI upset, and liver toxicity. Patients who start dapsone require monitoring including baseline LFTs and weekly CBCs for the first month, then every month thereafter. The patient verbalized understanding of the proper use and possible adverse effects of dapsone. All of the patient's questions and concerns were addressed.
Benzoyl Peroxide Pregnancy And Lactation Text: This medication is Pregnancy Category C. It is unknown if benzoyl peroxide is excreted in breast milk.
Topical Clindamycin Pregnancy And Lactation Text: This medication is Pregnancy Category B and is considered safe during pregnancy. It is unknown if it is excreted in breast milk.
Erythromycin Counseling:  I discussed with the patient the risks of erythromycin including but not limited to GI upset, allergic reaction, drug rash, diarrhea, increase in liver enzymes, and yeast infections.
Tazorac Pregnancy And Lactation Text: This medication is not safe during pregnancy. It is unknown if this medication is excreted in breast milk.
Topical Sulfur Applications Pregnancy And Lactation Text: This medication is Pregnancy Category C and has an unknown safety profile during pregnancy. It is unknown if this topical medication is excreted in breast milk.
High Dose Vitamin A Counseling: Side effects reviewed, pt to contact office should one occur.
Isotretinoin Counseling: Patient should get monthly blood tests, not donate blood, not drive at night if vision affected, not share medication, and not undergo elective surgery for 6 months after tx completed. Side effects reviewed, pt to contact office should one occur.
Minocycline Counseling: Patient advised regarding possible photosensitivity and discoloration of the teeth, skin, lips, tongue and gums. Patient instructed to avoid sunlight, if possible. When exposed to sunlight, patients should wear protective clothing, sunglasses, and sunscreen. The patient was instructed to call the office immediately if the following severe adverse effects occur:  hearing changes, easy bruising/bleeding, severe headache, or vision changes. The patient verbalized understanding of the proper use and possible adverse effects of minocycline. All of the patient's questions and concerns were addressed.

## 2023-05-16 ENCOUNTER — APPOINTMENT (RX ONLY)
Dept: URBAN - METROPOLITAN AREA CLINIC 7 | Facility: CLINIC | Age: 28
Setting detail: DERMATOLOGY
End: 2023-05-16

## 2023-05-16 DIAGNOSIS — Z41.9 ENCOUNTER FOR PROCEDURE FOR PURPOSES OTHER THAN REMEDYING HEALTH STATE, UNSPECIFIED: ICD-10-CM

## 2023-05-16 DIAGNOSIS — L85.3 XEROSIS CUTIS: ICD-10-CM

## 2023-05-16 DIAGNOSIS — L70.0 ACNE VULGARIS: ICD-10-CM

## 2023-05-16 PROCEDURE — 99214 OFFICE O/P EST MOD 30 MIN: CPT

## 2023-05-16 PROCEDURE — ? PRESCRIPTION MEDICATION MANAGEMENT

## 2023-05-16 PROCEDURE — ? TCA CHEMICAL PEEL

## 2023-05-16 PROCEDURE — ? PRESCRIPTION

## 2023-05-16 PROCEDURE — ? COUNSELING

## 2023-05-16 RX ORDER — CLINDAMYCIN PHOSPHATE 10 MG/G
GEL TOPICAL QAM
Qty: 60 | Refills: 0 | Status: ERX

## 2023-05-16 ASSESSMENT — LOCATION DETAILED DESCRIPTION DERM
LOCATION DETAILED: LEFT CENTRAL MALAR CHEEK
LOCATION DETAILED: RIGHT INFERIOR CENTRAL MALAR CHEEK
LOCATION DETAILED: RIGHT FOREHEAD
LOCATION DETAILED: LEFT CENTRAL BUCCAL CHEEK
LOCATION DETAILED: LEFT INFERIOR MEDIAL FOREHEAD
LOCATION DETAILED: RIGHT INFERIOR MEDIAL MALAR CHEEK
LOCATION DETAILED: NASAL DORSUM
LOCATION DETAILED: LEFT INFERIOR CENTRAL MALAR CHEEK

## 2023-05-16 ASSESSMENT — LOCATION SIMPLE DESCRIPTION DERM
LOCATION SIMPLE: RIGHT FOREHEAD
LOCATION SIMPLE: LEFT CHEEK
LOCATION SIMPLE: NOSE
LOCATION SIMPLE: LEFT FOREHEAD
LOCATION SIMPLE: RIGHT CHEEK

## 2023-05-16 ASSESSMENT — LOCATION ZONE DERM
LOCATION ZONE: FACE
LOCATION ZONE: NOSE

## 2023-05-16 NOTE — PROCEDURE: PRESCRIPTION MEDICATION MANAGEMENT
Continue Regimen: Sulfacleanse 8-4%, Clindamycin 1% gel, Tretinoin 0.1%, Winlevi 1%
Plan: Aldactone 100mg
Render In Strict Bullet Format?: No
Detail Level: Zone
Plan: Patient educated on moisturizer and sunscreen

## 2023-05-16 NOTE — PROCEDURE: COUNSELING
Sarecycline Counseling: Patient advised regarding possible photosensitivity and discoloration of the teeth, skin, lips, tongue and gums. Patient instructed to avoid sunlight, if possible. When exposed to sunlight, patients should wear protective clothing, sunglasses, and sunscreen. The patient was instructed to call the office immediately if the following severe adverse effects occur:  hearing changes, easy bruising/bleeding, severe headache, or vision changes. The patient verbalized understanding of the proper use and possible adverse effects of sarecycline. All of the patient's questions and concerns were addressed.
Winlevi Counseling:  I discussed with the patient the risks of topical clascoterone including but not limited to erythema, scaling, itching, and stinging. Patient voiced their understanding.
Spironolactone Counseling: Patient advised regarding risks of diarrhea, abdominal pain, hyperkalemia, birth defects (for female patients), liver toxicity and renal toxicity. The patient may need blood work to monitor liver and kidney function and potassium levels while on therapy. The patient verbalized understanding of the proper use and possible adverse effects of spironolactone. All of the patient's questions and concerns were addressed.
Include Pregnancy/Lactation Warning?: No
Minocycline Counseling: Patient advised regarding possible photosensitivity and discoloration of the teeth, skin, lips, tongue and gums. Patient instructed to avoid sunlight, if possible. When exposed to sunlight, patients should wear protective clothing, sunglasses, and sunscreen. The patient was instructed to call the office immediately if the following severe adverse effects occur:  hearing changes, easy bruising/bleeding, severe headache, or vision changes. The patient verbalized understanding of the proper use and possible adverse effects of minocycline. All of the patient's questions and concerns were addressed.
Tazorac Counseling:  Patient advised that medication is irritating and drying. Patient may need to apply sparingly and wash off after an hour before eventually leaving it on overnight. The patient verbalized understanding of the proper use and possible adverse effects of tazorac. All of the patient's questions and concerns were addressed.
Detail Level: Zone
Aklief counseling:  Patient advised to apply a pea-sized amount only at bedtime and wait 30 minutes after washing their face before applying. If too drying, patient may add a non-comedogenic moisturizer. The most commonly reported side effects including irritation, redness, scaling, dryness, stinging, burning, itching, and increased risk of sunburn. The patient verbalized understanding of the proper use and possible adverse effects of retinoids. All of the patient's questions and concerns were addressed.
Doxycycline Counseling:  Patient counseled regarding possible photosensitivity and increased risk for sunburn. Patient instructed to avoid sunlight, if possible. When exposed to sunlight, patients should wear protective clothing, sunglasses, and sunscreen. The patient was instructed to call the office immediately if the following severe adverse effects occur:  hearing changes, easy bruising/bleeding, severe headache, or vision changes. The patient verbalized understanding of the proper use and possible adverse effects of doxycycline. All of the patient's questions and concerns were addressed.
Azelaic Acid Counseling: Patient counseled that medicine may cause skin irritation and to avoid applying near the eyes. In the event of skin irritation, the patient was advised to reduce the amount of the drug applied or use it less frequently. The patient verbalized understanding of the proper use and possible adverse effects of azelaic acid. All of the patient's questions and concerns were addressed.
Topical Sulfur Applications Counseling: Topical Sulfur Counseling: Patient counseled that this medication may cause skin irritation or allergic reactions. In the event of skin irritation, the patient was advised to reduce the amount of the drug applied or use it less frequently. The patient verbalized understanding of the proper use and possible adverse effects of topical sulfur application. All of the patient's questions and concerns were addressed.
Tetracycline Counseling: Patient counseled regarding possible photosensitivity and increased risk for sunburn. Patient instructed to avoid sunlight, if possible. When exposed to sunlight, patients should wear protective clothing, sunglasses, and sunscreen. The patient was instructed to call the office immediately if the following severe adverse effects occur:  hearing changes, easy bruising/bleeding, severe headache, or vision changes. The patient verbalized understanding of the proper use and possible adverse effects of tetracycline. All of the patient's questions and concerns were addressed. Patient understands to avoid pregnancy while on therapy due to potential birth defects.
High Dose Vitamin A Pregnancy And Lactation Text: High dose vitamin A therapy is contraindicated during pregnancy and breast feeding.
Doxycycline Pregnancy And Lactation Text: This medication is Pregnancy Category D and not consider safe during pregnancy. It is also excreted in breast milk but is considered safe for shorter treatment courses.
Azithromycin Pregnancy And Lactation Text: This medication is considered safe during pregnancy and is also secreted in breast milk.
Bactrim Pregnancy And Lactation Text: This medication is Pregnancy Category D and is known to cause fetal risk. It is also excreted in breast milk.
Erythromycin Pregnancy And Lactation Text: This medication is Pregnancy Category B and is considered safe during pregnancy. It is also excreted in breast milk.
Topical Clindamycin Counseling: Patient counseled that this medication may cause skin irritation or allergic reactions. In the event of skin irritation, the patient was advised to reduce the amount of the drug applied or use it less frequently. The patient verbalized understanding of the proper use and possible adverse effects of clindamycin. All of the patient's questions and concerns were addressed.
Dapsone Pregnancy And Lactation Text: This medication is Pregnancy Category C and is not considered safe during pregnancy or breast feeding.
Azelaic Acid Pregnancy And Lactation Text: This medication is considered safe during pregnancy and breast feeding.
Topical Retinoid Pregnancy And Lactation Text: This medication is Pregnancy Category C. It is unknown if this medication is excreted in breast milk.
Isotretinoin Pregnancy And Lactation Text: This medication is Pregnancy Category X and is considered extremely dangerous during pregnancy. It is unknown if it is excreted in breast milk.
Benzoyl Peroxide Pregnancy And Lactation Text: This medication is Pregnancy Category C. It is unknown if benzoyl peroxide is excreted in breast milk.
Birth Control Pills Pregnancy And Lactation Text: This medication should be avoided if pregnant and for the first 30 days post-partum.
Winlevi Pregnancy And Lactation Text: This medication is considered safe during pregnancy and breastfeeding.
Sarecycline Pregnancy And Lactation Text: This medication is Pregnancy Category D and not consider safe during pregnancy. It is also excreted in breast milk.
Tazorac Pregnancy And Lactation Text: This medication is not safe during pregnancy. It is unknown if this medication is excreted in breast milk.
Topical Sulfur Applications Pregnancy And Lactation Text: This medication is Pregnancy Category C and has an unknown safety profile during pregnancy. It is unknown if this topical medication is excreted in breast milk.
Aklief Pregnancy And Lactation Text: It is unknown if this medication is safe to use during pregnancy. It is unknown if this medication is excreted in breast milk. Breastfeeding women should use the topical cream on the smallest area of the skin for the shortest time needed while breastfeeding. Do not apply to nipple and areola.
Spironolactone Pregnancy And Lactation Text: This medication can cause feminization of the male fetus and should be avoided during pregnancy. The active metabolite is also found in breast milk.
Topical Clindamycin Pregnancy And Lactation Text: This medication is Pregnancy Category B and is considered safe during pregnancy. It is unknown if it is excreted in breast milk.
High Dose Vitamin A Counseling: Side effects reviewed, pt to contact office should one occur.
Erythromycin Counseling:  I discussed with the patient the risks of erythromycin including but not limited to GI upset, allergic reaction, drug rash, diarrhea, increase in liver enzymes, and yeast infections.
Bactrim Counseling:  I discussed with the patient the risks of sulfa antibiotics including but not limited to GI upset, allergic reaction, drug rash, diarrhea, dizziness, photosensitivity, and yeast infections. Rarely, more serious reactions can occur including but not limited to aplastic anemia, agranulocytosis, methemoglobinemia, blood dyscrasias, liver or kidney failure, lung infiltrates or desquamative/blistering drug rashes.
Azithromycin Counseling:  I discussed with the patient the risks of azithromycin including but not limited to GI upset, allergic reaction, drug rash, diarrhea, and yeast infections.
Benzoyl Peroxide Counseling: Patient counseled that medicine may cause skin irritation and bleach clothing. In the event of skin irritation, the patient was advised to reduce the amount of the drug applied or use it less frequently. The patient verbalized understanding of the proper use and possible adverse effects of benzoyl peroxide. All of the patient's questions and concerns were addressed.
Birth Control Pills Counseling: Birth Control Pill Counseling: I discussed with the patient the potential side effects of OCPs including but not limited to increased risk of stroke, heart attack, thrombophlebitis, deep venous thrombosis, hepatic adenomas, breast changes, GI upset, headaches, and depression. The patient verbalized understanding of the proper use and possible adverse effects of OCPs. All of the patient's questions and concerns were addressed.
Dapsone Counseling: I discussed with the patient the risks of dapsone including but not limited to hemolytic anemia, agranulocytosis, rashes, methemoglobinemia, kidney failure, peripheral neuropathy, headaches, GI upset, and liver toxicity. Patients who start dapsone require monitoring including baseline LFTs and weekly CBCs for the first month, then every month thereafter. The patient verbalized understanding of the proper use and possible adverse effects of dapsone. All of the patient's questions and concerns were addressed.
Topical Retinoid counseling:  Patient advised to apply a pea-sized amount only at bedtime and wait 30 minutes after washing their face before applying. If too drying, patient may add a non-comedogenic moisturizer. The patient verbalized understanding of the proper use and possible adverse effects of retinoids. All of the patient's questions and concerns were addressed.
Isotretinoin Counseling: Patient should get monthly blood tests, not donate blood, not drive at night if vision affected, not share medication, and not undergo elective surgery for 6 months after tx completed. Side effects reviewed, pt to contact office should one occur.

## 2023-05-16 NOTE — PROCEDURE: TCA CHEMICAL PEEL
Post Peel Care: After the procedure, the treatment area was washed with soap and water, and a post-peel cream was applied. Sun protection and post-care instructions were reviewed with the patient. Peel done by CINDY.
Chemical Peel: 15% TCA
Post-Care Instructions: I reviewed with the patient in detail post-care instructions. Patient should avoid sun exposure and wear sun protection.
Frost (0,1+,2+,3+,4+): 0
Number Of Coats: 2
Detail Level: Zone
Prep: The treated area was degreased with pre-peel cleanser, and vaseline was applied for protection of mucous membranes.
Consent: Prior to the procedure, written consent was obtained and risks were reviewed, including but not limited to: redness, peeling, blistering, pigmentary change, scarring, infection, and pain.

## 2023-05-16 NOTE — PROCEDURE: MIPS QUALITY
Quality 431: Preventive Care And Screening: Unhealthy Alcohol Use - Screening: Patient did not receive brief counseling if identified as an unhealthy alcohol user
Quality 130: Documentation Of Current Medications In The Medical Record: Current Medications Documented
Quality 110: Preventive Care And Screening: Influenza Immunization: Influenza immunization was not ordered or administered, reason not given
Quality 226: Preventive Care And Screening: Tobacco Use: Screening And Cessation Intervention: Tobacco Screening not Performed
Detail Level: Zone

## 2023-06-03 ENCOUNTER — HEALTH MAINTENANCE LETTER (OUTPATIENT)
Age: 28
End: 2023-06-03

## 2023-07-03 RX ORDER — TRETIONIN 1 MG/G
CREAM TOPICAL
Qty: 20 | Refills: 0 | Status: ERX

## 2023-07-03 RX ORDER — CLINDAMYCIN PHOSPHATE 10 MG/G
GEL TOPICAL QAM
Qty: 60 | Refills: 0 | Status: ERX

## 2023-07-03 RX ORDER — SODIUM SULFACETAMIDE AND SULFUR 80; 40 MG/ML; MG/ML
LOTION TOPICAL BID
Qty: 473 | Refills: 0 | Status: ERX

## 2023-09-19 ENCOUNTER — RX ONLY (OUTPATIENT)
Age: 28
Setting detail: RX ONLY
End: 2023-09-19

## 2023-09-19 ENCOUNTER — APPOINTMENT (RX ONLY)
Dept: URBAN - METROPOLITAN AREA CLINIC 7 | Facility: CLINIC | Age: 28
Setting detail: DERMATOLOGY
End: 2023-09-19

## 2023-09-19 DIAGNOSIS — Z41.9 ENCOUNTER FOR PROCEDURE FOR PURPOSES OTHER THAN REMEDYING HEALTH STATE, UNSPECIFIED: ICD-10-CM

## 2023-09-19 DIAGNOSIS — L70.0 ACNE VULGARIS: ICD-10-CM

## 2023-09-19 PROCEDURE — ? PRESCRIPTION MEDICATION MANAGEMENT

## 2023-09-19 PROCEDURE — ? COUNSELING

## 2023-09-19 PROCEDURE — ? TCA CHEMICAL PEEL

## 2023-09-19 PROCEDURE — 99213 OFFICE O/P EST LOW 20 MIN: CPT

## 2023-09-19 RX ORDER — TRETIONIN 1 MG/G
CREAM TOPICAL
Qty: 20 | Refills: 0 | Status: CANCELLED
Stop reason: ENTERED-IN-ERROR

## 2023-09-19 RX ORDER — CLINDAMYCIN PHOSPHATE 10 MG/G
GEL TOPICAL QAM
Qty: 60 | Refills: 0 | Status: CANCELLED
Stop reason: ENTERED-IN-ERROR

## 2023-09-19 ASSESSMENT — LOCATION DETAILED DESCRIPTION DERM
LOCATION DETAILED: NASAL DORSUM
LOCATION DETAILED: LEFT CENTRAL MALAR CHEEK
LOCATION DETAILED: LEFT CENTRAL BUCCAL CHEEK
LOCATION DETAILED: LEFT INFERIOR CENTRAL MALAR CHEEK
LOCATION DETAILED: RIGHT FOREHEAD
LOCATION DETAILED: LEFT INFERIOR MEDIAL FOREHEAD
LOCATION DETAILED: RIGHT INFERIOR MEDIAL MALAR CHEEK
LOCATION DETAILED: RIGHT INFERIOR CENTRAL MALAR CHEEK

## 2023-09-19 ASSESSMENT — LOCATION SIMPLE DESCRIPTION DERM
LOCATION SIMPLE: RIGHT FOREHEAD
LOCATION SIMPLE: LEFT FOREHEAD
LOCATION SIMPLE: RIGHT CHEEK
LOCATION SIMPLE: NOSE
LOCATION SIMPLE: LEFT CHEEK

## 2023-09-19 ASSESSMENT — LOCATION ZONE DERM
LOCATION ZONE: NOSE
LOCATION ZONE: FACE

## 2023-09-19 NOTE — PROCEDURE: PRESCRIPTION MEDICATION MANAGEMENT
Continue Regimen: Sulfacleanse 8-4%, Clindamycin 1% gel, Tretinoin 0.1%, Winlevi 1%
Plan: Aldactone 100mg
Render In Strict Bullet Format?: No
Detail Level: Zone

## 2024-01-03 ENCOUNTER — APPOINTMENT (RX ONLY)
Dept: URBAN - METROPOLITAN AREA CLINIC 48 | Facility: CLINIC | Age: 29
Setting detail: DERMATOLOGY
End: 2024-01-03

## 2024-01-03 ENCOUNTER — RX ONLY (OUTPATIENT)
Age: 29
Setting detail: RX ONLY
End: 2024-01-03

## 2024-01-03 ENCOUNTER — APPOINTMENT (RX ONLY)
Dept: URBAN - METROPOLITAN AREA CLINIC 7 | Facility: CLINIC | Age: 29
Setting detail: DERMATOLOGY
End: 2024-01-03

## 2024-01-03 DIAGNOSIS — D22 MELANOCYTIC NEVI: ICD-10-CM

## 2024-01-03 DIAGNOSIS — D485 NEOPLASM OF UNCERTAIN BEHAVIOR OF SKIN: ICD-10-CM

## 2024-01-03 DIAGNOSIS — L70.0 ACNE VULGARIS: ICD-10-CM

## 2024-01-03 DIAGNOSIS — D18.0 HEMANGIOMA: ICD-10-CM

## 2024-01-03 DIAGNOSIS — Z41.9 ENCOUNTER FOR PROCEDURE FOR PURPOSES OTHER THAN REMEDYING HEALTH STATE, UNSPECIFIED: ICD-10-CM

## 2024-01-03 PROBLEM — D22.71 MELANOCYTIC NEVI OF RIGHT LOWER LIMB, INCLUDING HIP: Status: ACTIVE | Noted: 2024-01-03

## 2024-01-03 PROBLEM — D48.5 NEOPLASM OF UNCERTAIN BEHAVIOR OF SKIN: Status: ACTIVE | Noted: 2024-01-03

## 2024-01-03 PROBLEM — D22.5 MELANOCYTIC NEVI OF TRUNK: Status: ACTIVE | Noted: 2024-01-03

## 2024-01-03 PROBLEM — D18.01 HEMANGIOMA OF SKIN AND SUBCUTANEOUS TISSUE: Status: ACTIVE | Noted: 2024-01-03

## 2024-01-03 PROBLEM — Z70.8 OTHER SEX COUNSELING: Status: ACTIVE | Noted: 2024-01-03

## 2024-01-03 PROCEDURE — 99213 OFFICE O/P EST LOW 20 MIN: CPT | Mod: 25

## 2024-01-03 PROCEDURE — ? DIAGNOSIS COMMENT

## 2024-01-03 PROCEDURE — ? SEXUALLY TRANSMITTED INFECTION COUNSELING

## 2024-01-03 PROCEDURE — ? PRESCRIPTION MEDICATION MANAGEMENT

## 2024-01-03 PROCEDURE — 11301 SHAVE SKIN LESION 0.6-1.0 CM: CPT

## 2024-01-03 PROCEDURE — ? RECOMMENDATIONS

## 2024-01-03 PROCEDURE — ? SHAVE REMOVAL

## 2024-01-03 PROCEDURE — ? COUNSELING

## 2024-01-03 PROCEDURE — ? TCA CHEMICAL PEEL

## 2024-01-03 RX ORDER — CLINDAMYCIN PHOSPHATE 10 MG/G
GEL TOPICAL QAM
Qty: 60 | Refills: 0 | Status: ERX

## 2024-01-03 RX ORDER — TRETIONIN 1 MG/G
CREAM TOPICAL
Qty: 20 | Refills: 0 | Status: ERX

## 2024-01-03 RX ORDER — SODIUM SULFACETAMIDE AND SULFUR 80; 40 MG/ML; MG/ML
LOTION TOPICAL BID
Qty: 473 | Refills: 0 | Status: ERX

## 2024-01-03 ASSESSMENT — LOCATION SIMPLE DESCRIPTION DERM
LOCATION SIMPLE: NOSE
LOCATION SIMPLE: RIGHT CHEEK
LOCATION SIMPLE: RIGHT ELBOW
LOCATION SIMPLE: LEFT CHEEK
LOCATION SIMPLE: UPPER BACK
LOCATION SIMPLE: LEFT FOREHEAD
LOCATION SIMPLE: LEFT UPPER ARM
LOCATION SIMPLE: RIGHT FOREHEAD
LOCATION SIMPLE: RIGHT POSTERIOR THIGH
LOCATION SIMPLE: LEFT UPPER BACK
LOCATION SIMPLE: RIGHT CALF

## 2024-01-03 ASSESSMENT — LOCATION DETAILED DESCRIPTION DERM
LOCATION DETAILED: RIGHT INFERIOR CENTRAL MALAR CHEEK
LOCATION DETAILED: LEFT INFERIOR CENTRAL MALAR CHEEK
LOCATION DETAILED: RIGHT PROXIMAL LATERAL CALF
LOCATION DETAILED: RIGHT DISTAL MEDIAL CALF
LOCATION DETAILED: RIGHT FOREHEAD
LOCATION DETAILED: LEFT CENTRAL BUCCAL CHEEK
LOCATION DETAILED: RIGHT INFERIOR MEDIAL MALAR CHEEK
LOCATION DETAILED: LEFT ANTERIOR DISTAL UPPER ARM
LOCATION DETAILED: LEFT SUPERIOR MEDIAL UPPER BACK
LOCATION DETAILED: LEFT INFERIOR MEDIAL FOREHEAD
LOCATION DETAILED: SUPERIOR THORACIC SPINE
LOCATION DETAILED: RIGHT DISTAL POSTERIOR THIGH
LOCATION DETAILED: LEFT CENTRAL MALAR CHEEK
LOCATION DETAILED: RIGHT ELBOW
LOCATION DETAILED: NASAL DORSUM

## 2024-01-03 ASSESSMENT — LOCATION ZONE DERM
LOCATION ZONE: NOSE
LOCATION ZONE: LEG
LOCATION ZONE: TRUNK
LOCATION ZONE: FACE
LOCATION ZONE: ARM

## 2024-01-03 NOTE — PROCEDURE: REASSURANCE
Hide Include Location In Plan Question?: No
Detail Level: Generalized
Additional Note: No treatment needed
Detail Level: Simple
Additional Notes (Optional): Monitor lesions

## 2024-01-03 NOTE — PROCEDURE: DIAGNOSIS COMMENT
Comment: Patient does not need any refills at this time
Render Risk Assessment In Note?: no
Detail Level: Simple

## 2024-01-03 NOTE — PROCEDURE: SHAVE REMOVAL
Medical Necessity Information: It is in your best interest to select a reason for this procedure from the list below. All of these items fulfill various CMS LCD requirements except the new and changing color options.
Medical Necessity Clause: This procedure was medically necessary because the lesion that was treated was:
Lab: [id number]
Lab Facility: [id number]
Detail Level: Detailed
Was A Bandage Applied: Yes
Size Of Lesion In Cm (Required): 0.6
X Size Of Lesion In Cm (Optional): 0
Depth Of Shave: dermis
Biopsy Method: Dermablade
Anesthesia Type: 2% lidocaine with epinephrine
Anesthesia Volume In Cc: 1
Hemostasis: Electrocautery
Wound Care: Aquaphor
Path Notes (To The Dermatopathologist): Size: 0.6cm
Consent was obtained from the patient. The risks and benefits to therapy were discussed in detail. Specifically, the risks of infection, scarring, bleeding, prolonged wound healing, incomplete removal, allergy to anesthesia, nerve injury and recurrence were addressed. Prior to the procedure, the treatment site was clearly identified and confirmed by the patient. All components of Universal Protocol/PAUSE Rule completed.
Render Post-Care Instructions In Note?: no
Post-Care Instructions: I reviewed with the patient in detail post-care instructions. Patient is to keep the biopsy site dry overnight, and then apply bacitracin twice daily until healed. Patient may apply hydrogen peroxide soaks to remove any crusting.
Notification Instructions: Patient will be notified of pathology results. However, patient instructed to call the office if not contacted within 2 weeks.
Billing Type: Third-Party Bill

## 2024-01-03 NOTE — PROCEDURE: PRESCRIPTION MEDICATION MANAGEMENT
Continue Regimen: Sulfacleanse 8-4% daily \\nClindamycin 1% gel\\nTretinoin 0.1% topical cream\\nWinlevi 1% topical
Render In Strict Bullet Format?: No
Detail Level: Zone

## 2024-01-03 NOTE — PROCEDURE: COUNSELING
Detail Level: Zone
Detail Level: Generalized
Detail Level: Detailed
Detail Level: Simple
Sarecycline Counseling: Patient advised regarding possible photosensitivity and discoloration of the teeth, skin, lips, tongue and gums.  Patient instructed to avoid sunlight, if possible.  When exposed to sunlight, patients should wear protective clothing, sunglasses, and sunscreen.  The patient was instructed to call the office immediately if the following severe adverse effects occur:  hearing changes, easy bruising/bleeding, severe headache, or vision changes.  The patient verbalized understanding of the proper use and possible adverse effects of sarecycline.  All of the patient's questions and concerns were addressed.
Winlevi Counseling:  I discussed with the patient the risks of topical clascoterone including but not limited to erythema, scaling, itching, and stinging. Patient voiced their understanding.
Spironolactone Counseling: Patient advised regarding risks of diarrhea, abdominal pain, hyperkalemia, birth defects (for female patients), liver toxicity and renal toxicity. The patient may need blood work to monitor liver and kidney function and potassium levels while on therapy. The patient verbalized understanding of the proper use and possible adverse effects of spironolactone.  All of the patient's questions and concerns were addressed.
Include Pregnancy/Lactation Warning?: No
Minocycline Counseling: Patient advised regarding possible photosensitivity and discoloration of the teeth, skin, lips, tongue and gums.  Patient instructed to avoid sunlight, if possible.  When exposed to sunlight, patients should wear protective clothing, sunglasses, and sunscreen.  The patient was instructed to call the office immediately if the following severe adverse effects occur:  hearing changes, easy bruising/bleeding, severe headache, or vision changes.  The patient verbalized understanding of the proper use and possible adverse effects of minocycline.  All of the patient's questions and concerns were addressed.
Tazorac Counseling:  Patient advised that medication is irritating and drying.  Patient may need to apply sparingly and wash off after an hour before eventually leaving it on overnight.  The patient verbalized understanding of the proper use and possible adverse effects of tazorac.  All of the patient's questions and concerns were addressed.
Aklief counseling:  Patient advised to apply a pea-sized amount only at bedtime and wait 30 minutes after washing their face before applying.  If too drying, patient may add a non-comedogenic moisturizer.  The most commonly reported side effects including irritation, redness, scaling, dryness, stinging, burning, itching, and increased risk of sunburn.  The patient verbalized understanding of the proper use and possible adverse effects of retinoids.  All of the patient's questions and concerns were addressed.
Doxycycline Counseling:  Patient counseled regarding possible photosensitivity and increased risk for sunburn.  Patient instructed to avoid sunlight, if possible.  When exposed to sunlight, patients should wear protective clothing, sunglasses, and sunscreen.  The patient was instructed to call the office immediately if the following severe adverse effects occur:  hearing changes, easy bruising/bleeding, severe headache, or vision changes.  The patient verbalized understanding of the proper use and possible adverse effects of doxycycline.  All of the patient's questions and concerns were addressed.
Azelaic Acid Counseling: Patient counseled that medicine may cause skin irritation and to avoid applying near the eyes.  In the event of skin irritation, the patient was advised to reduce the amount of the drug applied or use it less frequently.   The patient verbalized understanding of the proper use and possible adverse effects of azelaic acid.  All of the patient's questions and concerns were addressed.
Topical Sulfur Applications Counseling: Topical Sulfur Counseling: Patient counseled that this medication may cause skin irritation or allergic reactions.  In the event of skin irritation, the patient was advised to reduce the amount of the drug applied or use it less frequently.   The patient verbalized understanding of the proper use and possible adverse effects of topical sulfur application.  All of the patient's questions and concerns were addressed.
Tetracycline Counseling: Patient counseled regarding possible photosensitivity and increased risk for sunburn.  Patient instructed to avoid sunlight, if possible.  When exposed to sunlight, patients should wear protective clothing, sunglasses, and sunscreen.  The patient was instructed to call the office immediately if the following severe adverse effects occur:  hearing changes, easy bruising/bleeding, severe headache, or vision changes.  The patient verbalized understanding of the proper use and possible adverse effects of tetracycline.  All of the patient's questions and concerns were addressed. Patient understands to avoid pregnancy while on therapy due to potential birth defects.
High Dose Vitamin A Pregnancy And Lactation Text: High dose vitamin A therapy is contraindicated during pregnancy and breast feeding.
Doxycycline Pregnancy And Lactation Text: This medication is Pregnancy Category D and not consider safe during pregnancy. It is also excreted in breast milk but is considered safe for shorter treatment courses.
Azithromycin Pregnancy And Lactation Text: This medication is considered safe during pregnancy and is also secreted in breast milk.
Bactrim Pregnancy And Lactation Text: This medication is Pregnancy Category D and is known to cause fetal risk.  It is also excreted in breast milk.
Erythromycin Pregnancy And Lactation Text: This medication is Pregnancy Category B and is considered safe during pregnancy. It is also excreted in breast milk.
Topical Clindamycin Counseling: Patient counseled that this medication may cause skin irritation or allergic reactions.  In the event of skin irritation, the patient was advised to reduce the amount of the drug applied or use it less frequently.   The patient verbalized understanding of the proper use and possible adverse effects of clindamycin.  All of the patient's questions and concerns were addressed.
Dapsone Pregnancy And Lactation Text: This medication is Pregnancy Category C and is not considered safe during pregnancy or breast feeding.
Azelaic Acid Pregnancy And Lactation Text: This medication is considered safe during pregnancy and breast feeding.
Topical Retinoid Pregnancy And Lactation Text: This medication is Pregnancy Category C. It is unknown if this medication is excreted in breast milk.
Isotretinoin Pregnancy And Lactation Text: This medication is Pregnancy Category X and is considered extremely dangerous during pregnancy. It is unknown if it is excreted in breast milk.
Benzoyl Peroxide Pregnancy And Lactation Text: This medication is Pregnancy Category C. It is unknown if benzoyl peroxide is excreted in breast milk.
Birth Control Pills Pregnancy And Lactation Text: This medication should be avoided if pregnant and for the first 30 days post-partum.
Winlevi Pregnancy And Lactation Text: This medication is considered safe during pregnancy and breastfeeding.
Sarecycline Pregnancy And Lactation Text: This medication is Pregnancy Category D and not consider safe during pregnancy. It is also excreted in breast milk.
Tazorac Pregnancy And Lactation Text: This medication is not safe during pregnancy. It is unknown if this medication is excreted in breast milk.
Topical Sulfur Applications Pregnancy And Lactation Text: This medication is Pregnancy Category C and has an unknown safety profile during pregnancy. It is unknown if this topical medication is excreted in breast milk.
Aklief Pregnancy And Lactation Text: It is unknown if this medication is safe to use during pregnancy.  It is unknown if this medication is excreted in breast milk.  Breastfeeding women should use the topical cream on the smallest area of the skin for the shortest time needed while breastfeeding.  Do not apply to nipple and areola.
Spironolactone Pregnancy And Lactation Text: This medication can cause feminization of the male fetus and should be avoided during pregnancy. The active metabolite is also found in breast milk.
Topical Clindamycin Pregnancy And Lactation Text: This medication is Pregnancy Category B and is considered safe during pregnancy. It is unknown if it is excreted in breast milk.
High Dose Vitamin A Counseling: Side effects reviewed, pt to contact office should one occur.
Erythromycin Counseling:  I discussed with the patient the risks of erythromycin including but not limited to GI upset, allergic reaction, drug rash, diarrhea, increase in liver enzymes, and yeast infections.
Bactrim Counseling:  I discussed with the patient the risks of sulfa antibiotics including but not limited to GI upset, allergic reaction, drug rash, diarrhea, dizziness, photosensitivity, and yeast infections.  Rarely, more serious reactions can occur including but not limited to aplastic anemia, agranulocytosis, methemoglobinemia, blood dyscrasias, liver or kidney failure, lung infiltrates or desquamative/blistering drug rashes.
Azithromycin Counseling:  I discussed with the patient the risks of azithromycin including but not limited to GI upset, allergic reaction, drug rash, diarrhea, and yeast infections.
Benzoyl Peroxide Counseling: Patient counseled that medicine may cause skin irritation and bleach clothing.  In the event of skin irritation, the patient was advised to reduce the amount of the drug applied or use it less frequently.   The patient verbalized understanding of the proper use and possible adverse effects of benzoyl peroxide.  All of the patient's questions and concerns were addressed.
Birth Control Pills Counseling: Birth Control Pill Counseling: I discussed with the patient the potential side effects of OCPs including but not limited to increased risk of stroke, heart attack, thrombophlebitis, deep venous thrombosis, hepatic adenomas, breast changes, GI upset, headaches, and depression.  The patient verbalized understanding of the proper use and possible adverse effects of OCPs. All of the patient's questions and concerns were addressed.
Dapsone Counseling: I discussed with the patient the risks of dapsone including but not limited to hemolytic anemia, agranulocytosis, rashes, methemoglobinemia, kidney failure, peripheral neuropathy, headaches, GI upset, and liver toxicity.  Patients who start dapsone require monitoring including baseline LFTs and weekly CBCs for the first month, then every month thereafter.  The patient verbalized understanding of the proper use and possible adverse effects of dapsone.  All of the patient's questions and concerns were addressed.
Topical Retinoid counseling:  Patient advised to apply a pea-sized amount only at bedtime and wait 30 minutes after washing their face before applying.  If too drying, patient may add a non-comedogenic moisturizer. The patient verbalized understanding of the proper use and possible adverse effects of retinoids.  All of the patient's questions and concerns were addressed.
Isotretinoin Counseling: Patient should get monthly blood tests, not donate blood, not drive at night if vision affected, not share medication, and not undergo elective surgery for 6 months after tx completed. Side effects reviewed, pt to contact office should one occur.

## 2024-01-03 NOTE — PROCEDURE: RECOMMENDATIONS
Recommendation Preamble: Daily use of sunscreen. Annual FBSE. Montior skin lesions.
Detail Level: Zone
Render Risk Assessment In Note?: no

## 2024-07-07 ENCOUNTER — HEALTH MAINTENANCE LETTER (OUTPATIENT)
Age: 29
End: 2024-07-07

## 2025-07-13 ENCOUNTER — HEALTH MAINTENANCE LETTER (OUTPATIENT)
Age: 30
End: 2025-07-13

## (undated) DEVICE — GLOVE PROTEXIS POWDER FREE SMT 6.5  2D72PT65X

## (undated) DEVICE — SUCTION MANIFOLD NEPTUNE 2 SYS 1 PORT 702-025-000

## (undated) DEVICE — DRAPE CONVERTORS U-DRAPE 60X72" 8476

## (undated) DEVICE — GLOVE GAMMEX NEOPRENE ULTRA SZ 6.5 LF 8513

## (undated) DEVICE — BNDG ESMARK 6" STERILE LF 820-3612

## (undated) DEVICE — DRAPE STOCKINETTE IMPERVIOUS 12" 1587

## (undated) RX ORDER — FENTANYL CITRATE 50 UG/ML
INJECTION, SOLUTION INTRAMUSCULAR; INTRAVENOUS
Status: DISPENSED
Start: 2023-02-01

## (undated) RX ORDER — PROPOFOL 10 MG/ML
INJECTION, EMULSION INTRAVENOUS
Status: DISPENSED
Start: 2023-02-01

## (undated) RX ORDER — ONDANSETRON 2 MG/ML
INJECTION INTRAMUSCULAR; INTRAVENOUS
Status: DISPENSED
Start: 2023-02-01

## (undated) RX ORDER — LIDOCAINE HYDROCHLORIDE 10 MG/ML
INJECTION, SOLUTION EPIDURAL; INFILTRATION; INTRACAUDAL; PERINEURAL
Status: DISPENSED
Start: 2020-01-22

## (undated) RX ORDER — OXYCODONE HYDROCHLORIDE 5 MG/1
TABLET ORAL
Status: DISPENSED
Start: 2023-02-01

## (undated) RX ORDER — MINERAL OIL
OIL (ML) MISCELLANEOUS
Status: DISPENSED
Start: 2023-02-01

## (undated) RX ORDER — KETOROLAC TROMETHAMINE 30 MG/ML
INJECTION, SOLUTION INTRAMUSCULAR; INTRAVENOUS
Status: DISPENSED
Start: 2023-02-01

## (undated) RX ORDER — HYDROMORPHONE HYDROCHLORIDE 1 MG/ML
INJECTION, SOLUTION INTRAMUSCULAR; INTRAVENOUS; SUBCUTANEOUS
Status: DISPENSED
Start: 2023-02-01

## (undated) RX ORDER — SCOLOPAMINE TRANSDERMAL SYSTEM 1 MG/1
PATCH, EXTENDED RELEASE TRANSDERMAL
Status: DISPENSED
Start: 2023-02-01

## (undated) RX ORDER — DEXAMETHASONE SODIUM PHOSPHATE 4 MG/ML
INJECTION, SOLUTION INTRA-ARTICULAR; INTRALESIONAL; INTRAMUSCULAR; INTRAVENOUS; SOFT TISSUE
Status: DISPENSED
Start: 2023-02-01